# Patient Record
Sex: MALE | Race: WHITE | NOT HISPANIC OR LATINO | Employment: FULL TIME | URBAN - NONMETROPOLITAN AREA
[De-identification: names, ages, dates, MRNs, and addresses within clinical notes are randomized per-mention and may not be internally consistent; named-entity substitution may affect disease eponyms.]

---

## 2020-04-20 ENCOUNTER — OFFICE VISIT CONVERTED (OUTPATIENT)
Dept: FAMILY MEDICINE CLINIC | Age: 40
End: 2020-04-20
Attending: NURSE PRACTITIONER

## 2020-05-29 ENCOUNTER — OFFICE VISIT CONVERTED (OUTPATIENT)
Dept: FAMILY MEDICINE CLINIC | Age: 40
End: 2020-05-29
Attending: NURSE PRACTITIONER

## 2020-07-24 ENCOUNTER — OFFICE VISIT CONVERTED (OUTPATIENT)
Dept: FAMILY MEDICINE CLINIC | Age: 40
End: 2020-07-24
Attending: NURSE PRACTITIONER

## 2020-07-24 ENCOUNTER — HOSPITAL ENCOUNTER (OUTPATIENT)
Dept: OTHER | Facility: HOSPITAL | Age: 40
Discharge: HOME | End: 2020-07-24
Attending: NURSE PRACTITIONER

## 2020-07-25 LAB
ALBUMIN SERPL-MCNC: 4.6 G/DL (ref 3.5–5)
ALBUMIN/GLOB SERPL: 1.7 {RATIO} (ref 1.4–2.6)
ALP SERPL-CCNC: 70 U/L (ref 53–128)
ALT SERPL-CCNC: 13 U/L (ref 10–40)
ANION GAP SERPL CALC-SCNC: 18 MMOL/L (ref 8–19)
AST SERPL-CCNC: 15 U/L (ref 15–50)
BASOPHILS # BLD MANUAL: 0.04 10*3/UL (ref 0–0.2)
BASOPHILS NFR BLD MANUAL: 0.6 % (ref 0–3)
BILIRUB SERPL-MCNC: 0.42 MG/DL (ref 0.2–1.3)
BUN SERPL-MCNC: 13 MG/DL (ref 5–25)
BUN/CREAT SERPL: 12 {RATIO} (ref 6–20)
CALCIUM SERPL-MCNC: 9.4 MG/DL (ref 8.7–10.4)
CHLORIDE SERPL-SCNC: 101 MMOL/L (ref 99–111)
CHOLEST SERPL-MCNC: 159 MG/DL (ref 107–200)
CHOLEST/HDLC SERPL: 4 {RATIO} (ref 3–6)
CONV CO2: 23 MMOL/L (ref 22–32)
CONV TOTAL PROTEIN: 7.3 G/DL (ref 6.3–8.2)
CREAT UR-MCNC: 1.08 MG/DL (ref 0.7–1.2)
DEPRECATED RDW RBC AUTO: 39.8 FL
EOSINOPHIL # BLD MANUAL: 0.06 10*3/UL (ref 0–0.7)
EOSINOPHIL NFR BLD MANUAL: 0.9 % (ref 0–7)
ERYTHROCYTE [DISTWIDTH] IN BLOOD BY AUTOMATED COUNT: 12.1 % (ref 11.5–14.5)
GFR SERPLBLD BASED ON 1.73 SQ M-ARVRAT: >60 ML/MIN/{1.73_M2}
GLOBULIN UR ELPH-MCNC: 2.7 G/DL (ref 2–3.5)
GLUCOSE SERPL-MCNC: 94 MG/DL (ref 70–99)
GRANS (ABSOLUTE): 3.9 10*3/UL (ref 2–8)
GRANS: 61.7 % (ref 30–85)
HBA1C MFR BLD: 15.6 G/DL (ref 14–18)
HCT VFR BLD AUTO: 44.2 % (ref 42–52)
HDLC SERPL-MCNC: 40 MG/DL (ref 40–60)
IMM GRANULOCYTES # BLD: 0 10*3/UL (ref 0–0.54)
IMM GRANULOCYTES NFR BLD: 0 % (ref 0–0.43)
LDLC SERPL CALC-MCNC: 105 MG/DL (ref 70–100)
LYMPHOCYTES # BLD MANUAL: 1.78 10*3/UL (ref 1–5)
LYMPHOCYTES NFR BLD MANUAL: 8.7 % (ref 3–10)
MCH RBC QN AUTO: 31.5 PG (ref 27–31)
MCHC RBC AUTO-ENTMCNC: 35.3 G/DL (ref 33–37)
MCV RBC AUTO: 89.1 FL (ref 80–96)
MONOCYTES # BLD AUTO: 0.55 10*3/UL (ref 0.2–1.2)
OSMOLALITY SERPL CALC.SUM OF ELEC: 284 MOSM/KG (ref 273–304)
PLATELET # BLD AUTO: 275 10*3/UL (ref 130–400)
PMV BLD AUTO: 9.6 FL (ref 7.4–10.4)
POTASSIUM SERPL-SCNC: 4.6 MMOL/L (ref 3.5–5.3)
RBC # BLD AUTO: 4.96 10*6/UL (ref 4.7–6.1)
SODIUM SERPL-SCNC: 137 MMOL/L (ref 135–147)
TRIGL SERPL-MCNC: 71 MG/DL (ref 40–150)
TSH SERPL-ACNC: 1.64 M[IU]/L (ref 0.27–4.2)
VARIANT LYMPHS NFR BLD MANUAL: 28.1 % (ref 20–45)
VLDLC SERPL-MCNC: 14 MG/DL (ref 5–37)
WBC # BLD AUTO: 6.33 10*3/UL (ref 4.8–10.8)

## 2020-10-23 ENCOUNTER — OFFICE VISIT CONVERTED (OUTPATIENT)
Dept: FAMILY MEDICINE CLINIC | Age: 40
End: 2020-10-23
Attending: NURSE PRACTITIONER

## 2021-04-09 ENCOUNTER — OFFICE VISIT CONVERTED (OUTPATIENT)
Dept: FAMILY MEDICINE CLINIC | Age: 41
End: 2021-04-09
Attending: NURSE PRACTITIONER

## 2021-05-18 NOTE — PROGRESS NOTES
Javier Almeida TRAVIS  1980     Office/Outpatient Visit    Visit Date: Fri, Oct 23, 2020 11:31 am    Provider: Stephanie Porras N.P. (Assistant: Gabriella Bennett MA)    Location: [facility]        Electronically signed by Stephanie Porras N.P. on  10/23/2020 12:22:23 PM                             Subjective:        CC: LEONEL is a 40 year old White male.  Patient presents today for follow up visit         HPI: HILLARY ROBLES is following up on anxiety and IBS. IBS was diagnosed about 2 years ago when living in [facility]. He had colonoscopy, EGD, and advised to eat high fiber diet. He takes Dicyclomine on occasion and PPI daily. He had noticed some anxiety earlier this year and that had worsened his IBS symptoms. He had not been sleeping well, been short tempered, and felt like he was having panic attacks. No suicidal ideation. He was started on Citalopram 4/2020 and Trazodone to help with sleep was added 3 months ago. Notes that he has been doing well. Desires to continue same medications. Denies suicidal ideation.    ROS:     CONSTITUTIONAL:  Negative for chills and fever.      CARDIOVASCULAR:  Negative for chest pain and palpitations.      RESPIRATORY:  Negative for dyspnea and frequent wheezing.      NEUROLOGICAL:  Negative for dizziness and headaches.      PSYCHIATRIC:  Positive for anxiety ( (stable on current medications) ) and (improved with current medications).   Negative for suicidal thoughts.          Past Medical History / Family History / Social History:         Last Reviewed on 4/20/2020 12:00 PM by Stephanie Porras    Past Medical History:             PAST MEDICAL HISTORY         Hypertension: no longer on medication;     Asthma: dx'd at age childhood;     Irritable Bowel Syndrome: diagnosed 2018 when living in [facility];     Allergies         PREVENTIVE HEALTH MAINTENANCE             COLORECTAL CANCER SCREENING: Up to date (colonoscopy q10y; sigmoidoscopy q5y; Cologuard q3y) was last done  8/26/2010, Results are in chart; EGD done in [facility]         Surgical History:         wisdom teeth removal;         Family History:     Father: Healthy     Mother: Type 2 Diabetes;  adopted     Sister(s): Bipolar Disorder         Social History:     Occupation: [employer]     Marital Status:      Children: 2 children         Tobacco/Alcohol/Supplements:     Last Reviewed on 7/24/2020 11:15 AM by Alfreda Duenas    Tobacco: Current Smoker: He currently smokes some days, Cigars.          Alcohol: Frequency: Socially         Current Problems:     Nicotine dependence, unspecified, uncomplicated    Generalized anxiety disorder    Essential (primary) hypertension    Mild intermittent asthma, uncomplicated    Mixed irritable bowel syndrome    Encounter for general adult medical examination without abnormal findings    Encounter for screening for depression        Immunizations:     None        Allergies:     Last Reviewed on 10/23/2020 11:33 AM by Gabriella Bennett    Penicillins:          Current Medications:     Last Reviewed on 10/23/2020 11:33 AM by Gabriella Bennett    ZyrTEC 10 mg oral tablet [take 1 tablet (10 mg) by oral route once daily]    esomeprazole magnesium 20 mg oral capsule,delayed release (enteric coated) [take 1 capsule]    melatonin 10 mg oral tablet [one tablet po qhs]    naproxen     loratadine 10 mg oral tablet [take 1 tablet (10 mg) by oral route once daily]    dicyclomine 20 mg oral tablet [take 1 tablet (20 mg) by oral route 3 times per day as needed]    citalopram 20 mg oral tablet [take 1 tablet (20 mg) by oral route once daily]    traZODone 50 mg oral tablet [TAKE 1/2 TO 1 TABLET BY MOUTH AT BEDTIME AS NEEDED]        Objective:        Vitals:         Historical:     7/24/2020  BP:   124/81 mm Hg ( (left arm, , sitting, );) 7/24/2020  P:   82bpm ( (left arm (BP Cuff), , sitting, );) 7/24/2020  Wt:   210.6lbs    Current: 10/23/2020 11:34:23 AM    Ht:  5 ft, 7 in;  Wt: 212.6 lbs;   BMI: 33.3T: 97.4 F (temporal);  BP: 136/81 mm Hg (left arm, sitting);  P: 71 bpm (left arm (BP Cuff), sitting);  sCr: 1.08 mg/dL;  GFR: 92.86        Exams:     PHYSICAL EXAM:     GENERAL: well developed, well nourished;  no apparent distress;     RESPIRATORY: normal respiratory rate and pattern with no distress; normal breath sounds with no rales, rhonchi, wheezes or rubs;     CARDIOVASCULAR: normal rate; rhythm is regular;     NEUROLOGIC: mental status: alert and oriented x 3; GROSSLY INTACT     PSYCHIATRIC: appropriate affect and demeanor; normal speech pattern;         Assessment:         F41.1   Generalized anxiety disorder       K58.2   Mixed irritable bowel syndrome       J45.20   Mild intermittent asthma, uncomplicated           ORDERS:         Meds Prescribed:       [Refilled] citalopram 20 mg oral tablet [take 1 tablet (20 mg) by oral route once daily], #90 (ninety) tablets, Refills: 1 (one)         Lab Orders:       APPTO  Appointment need  (In-House)              Other Orders:         Depression screen positive and follow up plan documented  (In-House)                      Plan:         Generalized anxiety disorderContinue current medications. No refill needed on Trazodone at this time as he has only taken 1/2 tablet most nights. F/U 6 months or sooner as needed.     MIPS PHQ-9 Depression Screening: Completed form scanned and in chart; Total Score 5 Positive Depression Screen: Suicide Risk Assessment completed--denies suicidal/homicidal ideation; Stable on medications. No suicidal ideation.      FOLLOW-UP: Schedule a follow-up visit in 6 months.            Prescriptions:       [Refilled] citalopram 20 mg oral tablet [take 1 tablet (20 mg) by oral route once daily], #90 (ninety) tablets, Refills: 1 (one)           Orders:       APPTO  Appointment need  (In-House)              Depression screen positive and follow up plan documented  (In-House)              Mixed irritable bowel syndromeStable         Mild intermittent asthma, uncomplicatedStable            Patient Recommendations:        For  Generalized anxiety disorder:    Schedule a follow-up visit in 6 months.              Charge Capture:         Primary Diagnosis:     F41.1  Generalized anxiety disorder           Orders:      56963  Office/outpatient visit; established patient, level 3  (In-House)            APPTO  Appointment need  (In-House)              Depression screen positive and follow up plan documented  (In-House)              K58.2  Mixed irritable bowel syndrome     J45.20  Mild intermittent asthma, uncomplicated         ADDENDUMS:      ____________________________________    Addendum: 10/28/2020 09:07 Stephanie Kern        Remove [id number]    Add [id number]    AC

## 2021-05-18 NOTE — PROGRESS NOTES
Javier Almeida  1980     Office/Outpatient Visit    Visit Date: Mon, Apr 20, 2020 11:20 am    Provider: Stephanie Porras N.P. (Assistant: Gabriella Bennett MA)    Location: [facility]        Electronically signed by Stephanie Porras N.P. on  04/20/2020 01:58:21 PM                             Subjective:        CC: Mr. Almeida is a 39 year old male.  New patient establishment, discuss IBS and anxiety         HPI: [email or web address]. consented to this telemedicine visit. Persons present during the telemedicine consultation include MIMA., patient, his wife, and BRANDIE Perez. This visit is being conducted over [employer] pedro video and audio.    He reports history of IBS diagnosed about 2 years ago when living in [facility]. He had colonoscopy and EGD at that time. Was advised to eat high fiber diet. Takes Dicyclomine on occasion. Has been doing fairly well until recently has had increased abdominal symptoms along with anxiety. Notes that he has not been sleeping well, been short tempered, and what he describes as panic attacks. Symptoms occur almost daily. Denies suicidal ideation. Feels that he should start some anxiety medications.    Also with history of HTN but is no longer on medication. Report that he has been monitoring and last readings have been running well.    History of asthma without any exacerbations since childhood.    ROS:     CONSTITUTIONAL:  Negative for chills and fever.      CARDIOVASCULAR:  Negative for chest pain and palpitations.      RESPIRATORY:  Negative for dyspnea and frequent wheezing.      GASTROINTESTINAL:  Positive for diarrhea.   Negative for abdominal pain or vomiting.      INTEGUMENTARY:  Negative for rash.      NEUROLOGICAL:  Negative for dizziness and headaches.      PSYCHIATRIC:  Positive for anxiety.   Negative for suicidal thoughts.          Past Medical History / Family History / Social History:         Last Reviewed on 4/20/2020 12:00 PM by Stephanie Porras    Past Medical  History:             PAST MEDICAL HISTORY         Hypertension: no longer on medication;     Asthma: dx'd at age childhood;     Irritable Bowel Syndrome: diagnosed 2018 when living in [facility];     Allergies         PREVENTIVE HEALTH MAINTENANCE             COLORECTAL CANCER SCREENING: Up to date (colonoscopy q10y; sigmoidoscopy q5y; Cologuard q3y) was last done 2018; EGD done in [facility]         Surgical History:         wisdom teeth removal;         Family History:     Father: Healthy     Mother: Type 2 Diabetes;  adopted     Sister(s): Bipolar Disorder         Social History:     Occupation: [employer]     Marital Status:      Children: 2 children         Tobacco/Alcohol/Supplements:     Last Reviewed on 4/20/2020 11:21 AM by Gabriella Bennett    Tobacco: Current Smoker: He currently smokes some days, Cigars.          Alcohol: Frequency: Socially         Current Problems:     Generalized anxiety disorder    Mixed irritable bowel syndrome        Immunizations:     None        Allergies:     Last Reviewed on 4/20/2020 11:21 AM by Gabriella Bennett    Penicillins:          Current Medications:     Last Reviewed on 4/20/2020 11:23 AM by Gabriella Bennett    ZyrTEC 10 mg oral tablet [take 1 tablet (10 mg) by oral route once daily]    esomeprazole magnesium 20 mg oral capsule,delayed release (enteric coated) [take 1 capsule]    melatonin 10 mg oral tablet [one tablet po qhs]        Objective:        Exams:     PHYSICAL EXAM:     GENERAL: well developed, well nourished;  no apparent distress;     EYES: extraocular movements intact;     RESPIRATORY: normal appearance and symmetric expansion of chest wall; normal respiratory rate and pattern with no distress;     NEUROLOGIC: mental status: alert and oriented x 3;     PSYCHIATRIC: affect/demeanor: anxious, tearful;  normal speech pattern; normal thought and perception;         Assessment:         F41.1   Generalized anxiety disorder       K58.2   Mixed  irritable bowel syndrome       I10   Essential (primary) hypertension       J45.20   Mild intermittent asthma, uncomplicated       F17.200   Nicotine dependence, unspecified, uncomplicated           ORDERS:         Meds Prescribed:       [New Rx] citalopram 10 mg oral tablet [take 1 tablet (10 mg) by oral route once daily], #30 (thirty) tablets, Refills: 1 (one)       [New Rx] dicyclomine 20 mg oral tablet [take 1 tablet (20 mg) by oral route 3 times per day as needed], #90 (ninety) tablets, Refills: 0 (zero)         Lab Orders:       APPTO  Appointment need  (In-House)              Other Orders:       4004F  Pt scrnd tobacco use rcvd tobacco cessation talk  (In-House)                      Plan:         Generalized anxiety disorderWill start Citalopram. Potential side effects discussed. Seek care immediately for worsening symptoms such as suicidal ideation.    MIPS Vaccines Flu and Pneumonia updated in Shot record     FOLLOW-UP: Schedule a follow-up appointment in 6 weeks.            Prescriptions:       [New Rx] citalopram 10 mg oral tablet [take 1 tablet (10 mg) by oral route once daily], #30 (thirty) tablets, Refills: 1 (one)           Orders:       APPTO  Appointment need  (In-House)              Mixed irritable bowel syndromeLow FODMAP diet. Info mailed to patient. May need to see gastroenterologist depending on symptom progression.        RECOMMENDATIONS given include: decrease alcohol in diet, decrease caffieine in diet, and increase fiber in diet.            Prescriptions:       [New Rx] dicyclomine 20 mg oral tablet [take 1 tablet (20 mg) by oral route 3 times per day as needed], #90 (ninety) tablets, Refills: 0 (zero)         Essential (primary) hypertensionStable. Will continue to monitor.         Mild intermittent asthma, uncomplicatedStable        Nicotine dependence, unspecified, uncomplicated        RECOMMENDATIONS given include: Counseled on smoking cessation and advised of the benefits to patient's  health if he were to stop smoking. Counseling for less than 3 minutes.            Orders:       4004F  Pt scrnd tobacco use rcvd tobacco cessation talk  (In-House)                  Patient Recommendations:        For  Generalized anxiety disorder:    Schedule a follow-up visit in 6 weeks.          For  Mixed irritable bowel syndrome:    increase fiber in diet         For  Nicotine dependence, unspecified, uncomplicated:        Stop smoking.              Charge Capture:         Primary Diagnosis:     F41.1  Generalized anxiety disorder           Orders:      97260  Office visit - new pt, level 3  (In-House)            APPTO  Appointment need  (In-House)              K58.2  Mixed irritable bowel syndrome     I10  Essential (primary) hypertension     J45.20  Mild intermittent asthma, uncomplicated     F17.200  Nicotine dependence, unspecified, uncomplicated           Orders:      4004F  Pt scrnd tobacco use rcvd tobacco cessation talk  (In-House)

## 2021-05-18 NOTE — PROGRESS NOTES
Javier Almeida  1980     Office/Outpatient Visit    Visit Date: Fri, Jul 24, 2020 11:11 am    Provider: Stephanie Porras N.P. (Assistant: Alfreda Duenas MA)    Location: [facility]        Electronically signed by Stephanie Porras N.P. on  07/30/2020 08:26:01 AM                             Subjective:        CC: LEONEL is a 40 year old male.  physical         HPI:           LEONEL presents with encounter for general adult medical examination without abnormal findings.  He performs testicular self-exams occasionally.  He is not current with his Td and influenza immunization.      Smoking status: He smokes cigars.            PHQ-9 Depression Screening: Completed form scanned and in chart; Total Score 7 HILLARY ROBLES is following up on anxiety and IBS. IBS was diagnosed about 2 years ago when living in [facility]. He had colonoscopy, EGD, and advised to eat high fiber diet. He takes Dicyclomine on occasion and PPI daily. He had noticed some anxiety earlier this year and that had worsened his IBS symptoms. He had not been sleeping well, been short tempered, and felt like he was having panic attacks. No suicidal ideation. He was started on Citalopram 4/2020 and had dose increased at last visit.this. He feels that this has helped take the edge off and can tell a difference since increasing the dose.  He is still having some difficulty staying asleep and wakes up during the night. He has had some recent occupational stressors as well. Missed work on Sunday.    ROS:     CONSTITUTIONAL:  Negative for chills and fever.      E/N/T:  Negative for ear pain and sore throat.      CARDIOVASCULAR:  Negative for chest pain and palpitations.      RESPIRATORY:  Negative for dyspnea and frequent wheezing.      GASTROINTESTINAL:  Positive for acid reflux symptoms ( stable ) and diarrhea ( stable ).      MUSCULOSKELETAL:  Negative for joint stiffness and myalgias.      INTEGUMENTARY:  Negative for rash.      NEUROLOGICAL:   Negative for dizziness and headaches.      HEMATOLOGIC/LYMPHATIC:  Negative for easy bruising and lymphadenopathy.      PSYCHIATRIC:  Positive for anxiety and sleep disturbance.   Negative for suicidal thoughts.          Past Medical History / Family History / Social History:         Last Reviewed on 4/20/2020 12:00 PM by Stephanie Porras    Past Medical History:             PAST MEDICAL HISTORY         Hypertension: no longer on medication;     Asthma: dx'd at age childhood;     Irritable Bowel Syndrome: diagnosed 2018 when living in [facility];     Allergies         PREVENTIVE HEALTH MAINTENANCE             COLORECTAL CANCER SCREENING: Up to date (colonoscopy q10y; sigmoidoscopy q5y; Cologuard q3y) was last done 8/26/2010, Results are in chart; EGD done in [facility]         Surgical History:         wisdom teeth removal;         Family History:     Father: Healthy     Mother: Type 2 Diabetes;  adopted     Sister(s): Bipolar Disorder         Social History:     Occupation: [employer]     Marital Status:      Children: 2 children         Tobacco/Alcohol/Supplements:     Last Reviewed on 5/29/2020 11:05 AM by Samira Zapata    Tobacco: Current Smoker: He currently smokes some days, Cigars.          Alcohol: Frequency: Socially         Current Problems:     Nicotine dependence, unspecified, uncomplicated    Generalized anxiety disorder    Essential (primary) hypertension    Mild intermittent asthma, uncomplicated    Mixed irritable bowel syndrome        Immunizations:     None        Allergies:     Last Reviewed on 5/29/2020 11:05 AM by Samira Zapata    Penicillins:          Current Medications:     Last Reviewed on 5/29/2020 11:06 AM by Samira Zapata    ZyrTEC 10 mg oral tablet [take 1 tablet (10 mg) by oral route once daily]    esomeprazole magnesium 20 mg oral capsule,delayed release (enteric coated) [take 1 capsule]    melatonin 10 mg oral tablet [one tablet po qhs]    naproxen     loratadine 10  mg oral tablet [take 1 tablet (10 mg) by oral route once daily]    citalopram 10 mg oral tablet [TAKE 1 TABLET(10 MG) BY MOUTH EVERY DAY]    dicyclomine 20 mg oral tablet [take 1 tablet (20 mg) by oral route 3 times per day as needed]    citalopram 20 mg oral tablet [take 1 tablet (20 mg) by oral route once daily]        Objective:        Vitals:         Current: 7/24/2020 11:16:48 AM    Ht:  5 ft, 7 in;  Wt: 210.6 lbs;  BMI: 33.0T: 97.6 F (temporal);  BP: 124/81 mm Hg (left arm, sitting);  P: 82 bpm (left arm (BP Cuff), sitting)        Exams:     PHYSICAL EXAM:     GENERAL: well developed, well nourished;  no apparent distress;     EYES: PERRL, EOMI     E/N/T: EARS: external auditory canal normal;  bilateral TMs are normal;  NOSE: normal turbinates; no sinus tenderness; OROPHARYNX: oral mucosa is normal; posterior pharynx shows no exudate;     NECK: range of motion is normal; trachea is midline;     RESPIRATORY: normal respiratory rate and pattern with no distress; normal breath sounds with no rales, rhonchi, wheezes or rubs;     CARDIOVASCULAR: normal rate; rhythm is regular;     GASTROINTESTINAL: nontender; normal bowel sounds; no organomegaly;     SKIN:  no significant rashes or lesions; no suspicious moles;     MUSCULOSKELETAL: normal gait; normal range of motion of all major muscle groups; no limb or joint pain with range of motion;     NEUROLOGIC: mental status: alert and oriented x 3; GROSSLY INTACT     PSYCHIATRIC: appropriate affect and demeanor; normal speech pattern; normal thought and perception;         Assessment:         Z00.00   Encounter for general adult medical examination without abnormal findings       Z13.31   Encounter for screening for depression       F17.200   Nicotine dependence, unspecified, uncomplicated       F41.1   Generalized anxiety disorder           ORDERS:         Meds Prescribed:       [Refilled] citalopram 20 mg oral tablet [take 1 tablet (20 mg) by oral route once daily], #30  (thirty) tablets, Refills: 2 (two)       [New Rx] traZODone 50 mg oral tablet [take 1/2 to 1 tablet by oral route at bedtime as needed], #30 (thirty) tablets, Refills: 1 (one)         Lab Orders:       [id number]  [facility] PHYSICAL: CMP, CBC, TSH, LIPID: [id number], [id number]  (Send-Out)            APPTO  Appointment need  (In-House)              Other Orders:       4004F  Pt scrnd tobacco use vd tobacco cessation talk  (In-House)              Depression screen positive and follow up plan documented  (In-House)                      Plan:         Encounter for general adult medical examination without abnormal findings    LABORATORY:  Labs ordered to be performed today include PHYSICAL PANEL; CMP, CBC, TSH, LIPID.  [facility] Vaccines Flu and Pneumonia updated in Shot record     COUNSELING was provided today regarding the following topics: healthy eating habits, regular exercise, testicular self-exam, and ADVISED TO SEE AN EYE DOCTOR AND A DENTIST REGULARLY.            Orders:       [id number]  [facility] PHYSICAL: CMP, CBC, TSH, LIPID: [id number], [id number]  (Send-Out)              Encounter for screening for depression    [facility] PHQ-9 Depression Screening: Completed form scanned and in chart; Total Score 7 Positive Depression Screen: Suicide Risk Assessment completed--denies suicidal/homicidal ideation; Pharmacologic intervention initiated/modified           Orders:         Depression screen positive and follow up plan documented  (In-House)              Nicotine dependence, unspecified, uncomplicated        RECOMMENDATIONS given include: Counseled on smoking cessation and advised of the benefits to patient's health if he were to stop smoking. Counseling for less than 3 minutes.            Orders:       4004F  Pt scrnd tobacco use rcvd tobacco cessation talk  (In-House)              Generalized anxiety disorderContinue Citalopram and add Trazodone to help with sleep. F/U 3 months or sooner as needed. Seek  care immediately for worsening symptoms such as suicidal ideation.        FOLLOW-UP: Schedule a follow-up visit in 3 months.            Prescriptions:       [Refilled] citalopram 20 mg oral tablet [take 1 tablet (20 mg) by oral route once daily], #30 (thirty) tablets, Refills: 2 (two)       [New Rx] traZODone 50 mg oral tablet [take 1/2 to 1 tablet by oral route at bedtime as needed], #30 (thirty) tablets, Refills: 1 (one)           Orders:       APPTO  Appointment need  (In-House)                  Patient Recommendations:        For  Encounter for general adult medical examination without abnormal findings:    Limit dietary intake of fat (especially saturated fat) and cholesterol.  Eat a variety of foods, including plenty of fruits, vegetables, and grain containg fiber, limit fat intake to 30% of total calories. Balance caloric intake with energy expended. Maintaining regular physical activity is advised to help prevent heart disease, hypertension, diabetes, and obesity.    Testicular cancer is the #1 cancer in men ages 15-35.  You should regularly examine your testicles for knots, lumps, or tenderness. Testicular pain, even if not associated with any masses, needs to be evaluated by your doctor!          For  Nicotine dependence, unspecified, uncomplicated:        Stop smoking.          For  Generalized anxiety disorder:    Schedule a follow-up visit in 3 months.              Charge Capture:         Primary Diagnosis:     Z00.00  Encounter for general adult medical examination without abnormal findings           Orders:      [id number]  Preventive medicine, established patient, age 18-39 years  (In-House)              Z13.31  Encounter for screening for depression           Orders:        Depression screen positive and follow up plan documented  (In-House)              F17.200  Nicotine dependence, unspecified, uncomplicated           Orders:      4004F  Pt scrnd tobacco use rcvd tobacco cessation talk  (In-House)               F41.1  Generalized anxiety disorder           Orders:      APPTO  Appointment need  (In-House)

## 2021-05-18 NOTE — PROGRESS NOTES
Javier Almeida  1980     Office/Outpatient Visit    Visit Date: Fri, May 29, 2020 11:05 am    Provider: Stephanie Porras N.P. (Assistant: Samira Zapata, RN)    Location: [facility]        Electronically signed by Stephanie Porras N.P. on  05/29/2020 11:38:07 AM                             Subjective:        CC: LEONEL is a 39 year old male.  medication follow up- [facility] video- [phone] Today's encounter is being done with a telehealth visit. He has consented verbally with two witnesses for todays treatment. Todays visit is being conducted by audio and video. Individuals present during the telemedicine consultation include Samira Zapata RN         HPI: HILLARY ROBLES is following up on anxiety and IBS. IBS was diagnosed about 2 years ago when living in [facility]. He had colonoscopy, EGD, and advised to eat high fiber diet. He takes Dicyclomine on occasion and PPI daily. He had noticed some anxiety a few months ago and that had worsened his IBS symptoms. He had not been sleeping well, been short tempered, and felt like he was having panic attacks. No suicidal ideation. He was started on Citalopram one month ago. He has not noticed any side effects from this. He feels like the edge has been taken off and symptoms are not as bad as they were. His stomach as been doing better. He tried to stop his PPI but started having stomach issues that resolved when he restarted. He is still having some difficulty staying asleep and wakes up during the night.     Also with history of HTN but is no longer on medication. Report that he has been monitoring and last readings have been running well.    History of asthma without any exacerbations since childhood.    ROS:     CONSTITUTIONAL:  Negative for chills and fever.      EYES:  Negative for blurred vision and eye drainage.      E/N/T:  Negative for ear pain and sore throat.      CARDIOVASCULAR:  Negative for chest pain and palpitations.      RESPIRATORY:  Negative  for dyspnea and frequent wheezing.      NEUROLOGICAL:  Negative for dizziness and headaches.      PSYCHIATRIC:  Positive for anxiety ( (improved on citalopram) ) and sleep disturbance.   Negative for suicidal thoughts.          Past Medical History / Family History / Social History:         Last Reviewed on 4/20/2020 12:00 PM by Stephanie Porras    Past Medical History:             PAST MEDICAL HISTORY         Hypertension: no longer on medication;     Asthma: dx'd at age childhood;     Irritable Bowel Syndrome: diagnosed 2018 when living in [facility];     Allergies         PREVENTIVE HEALTH MAINTENANCE             COLORECTAL CANCER SCREENING: Up to date (colonoscopy q10y; sigmoidoscopy q5y; Cologuard q3y) was last done 8/26/2010, Results are in chart; EGD done in [facility]         Surgical History:         wisdom teeth removal;         Family History:     Father: Healthy     Mother: Type 2 Diabetes;  adopted     Sister(s): Bipolar Disorder         Social History:     Occupation: [employer]     Marital Status:      Children: 2 children         Tobacco/Alcohol/Supplements:     Last Reviewed on 4/20/2020 11:21 AM by Gabriella Bennett    Tobacco: Current Smoker: He currently smokes some days, Cigars.          Alcohol: Frequency: Socially         Current Problems:     Nicotine dependence, unspecified, uncomplicated    Generalized anxiety disorder    Essential (primary) hypertension    Mild intermittent asthma, uncomplicated    Mixed irritable bowel syndrome        Immunizations:     None        Allergies:     Last Reviewed on 5/29/2020 11:05 AM by Samira Zapata    Penicillins:          Current Medications:     Last Reviewed on 5/29/2020 11:06 AM by Samira Zapata    ZyrTEC 10 mg oral tablet [take 1 tablet (10 mg) by oral route once daily]    esomeprazole magnesium 20 mg oral capsule,delayed release (enteric coated) [take 1 capsule]    melatonin 10 mg oral tablet [one tablet po qhs]    naproxen      citalopram 10 mg oral tablet [TAKE 1 TABLET(10 MG) BY MOUTH EVERY DAY]    dicyclomine 20 mg oral tablet [take 1 tablet (20 mg) by oral route 3 times per day as needed]    loratadine 10 mg oral tablet [take 1 tablet (10 mg) by oral route once daily]        Objective:        Exams:     PHYSICAL EXAM:     GENERAL: well developed, well nourished;  no apparent distress;     RESPIRATORY: normal respiratory rate and pattern with no distress;     NEUROLOGIC: mental status: alert and oriented x 3;     PSYCHIATRIC: appropriate affect and demeanor; normal speech pattern; normal thought and perception;         Assessment:         F41.1   Generalized anxiety disorder       F17.200   Nicotine dependence, unspecified, uncomplicated       I10   Essential (primary) hypertension       J45.20   Mild intermittent asthma, uncomplicated       K58.2   Mixed irritable bowel syndrome           ORDERS:         Meds Prescribed:       [New Rx] citalopram 20 mg oral tablet [take 1 tablet (20 mg) by oral route once daily], #30 (thirty) tablets, Refills: 1 (one)         Other Orders:       4004F  Pt scrnd tobacco use vd tobacco cessation talk  (In-House)                      Plan:         Generalized anxiety disorderWill increase dose of Citalopram to 20 mg daily. F/U 2 months. May need to add something at night to help with sleep if still having problems.           Prescriptions:       [New Rx] citalopram 20 mg oral tablet [take 1 tablet (20 mg) by oral route once daily], #30 (thirty) tablets, Refills: 1 (one)         Nicotine dependence, unspecified, uncomplicated        RECOMMENDATIONS given include: Counseled on smoking cessation and advised of the benefits to patient's health if he were to stop smoking. Counseling for less than 3 minutes.            Orders:       4004F  Pt scrnd tobacco use rcvd tobacco cessation talk  (In-House)              Essential (primary) hypertensionStable. Continue to monitor.         Mild intermittent asthma,  uncomplicatedStable        Mixed irritable bowel syndromeStable. Has started trying to follow low FODMAP diet            Patient Recommendations:        For  Nicotine dependence, unspecified, uncomplicated:        Stop smoking.              Charge Capture:         Primary Diagnosis:     F41.1  Generalized anxiety disorder           Orders:      46720  Office/outpatient visit; established patient, level 4  (In-House)              F17.200  Nicotine dependence, unspecified, uncomplicated           Orders:      4004F  Pt scrnd tobacco use rcvd tobacco cessation talk  (In-House)              I10  Essential (primary) hypertension     J45.20  Mild intermittent asthma, uncomplicated     K58.2  Mixed irritable bowel syndrome

## 2021-07-02 VITALS
HEIGHT: 67 IN | HEART RATE: 71 BPM | BODY MASS INDEX: 33.37 KG/M2 | TEMPERATURE: 97.4 F | WEIGHT: 212.6 LBS | DIASTOLIC BLOOD PRESSURE: 81 MMHG | SYSTOLIC BLOOD PRESSURE: 136 MMHG

## 2021-07-02 VITALS
DIASTOLIC BLOOD PRESSURE: 85 MMHG | HEIGHT: 67 IN | TEMPERATURE: 99.1 F | SYSTOLIC BLOOD PRESSURE: 137 MMHG | WEIGHT: 213.2 LBS | BODY MASS INDEX: 33.46 KG/M2 | HEART RATE: 70 BPM

## 2021-07-02 VITALS
HEIGHT: 67 IN | SYSTOLIC BLOOD PRESSURE: 124 MMHG | BODY MASS INDEX: 33.06 KG/M2 | WEIGHT: 210.6 LBS | TEMPERATURE: 97.6 F | DIASTOLIC BLOOD PRESSURE: 81 MMHG | HEART RATE: 82 BPM

## 2021-07-09 ENCOUNTER — LAB (OUTPATIENT)
Dept: LAB | Facility: HOSPITAL | Age: 41
End: 2021-07-09

## 2021-07-09 ENCOUNTER — OFFICE VISIT (OUTPATIENT)
Dept: FAMILY MEDICINE CLINIC | Age: 41
End: 2021-07-09

## 2021-07-09 VITALS
OXYGEN SATURATION: 97 % | SYSTOLIC BLOOD PRESSURE: 144 MMHG | DIASTOLIC BLOOD PRESSURE: 96 MMHG | HEART RATE: 81 BPM | TEMPERATURE: 98 F | BODY MASS INDEX: 33.9 KG/M2 | WEIGHT: 216 LBS | HEIGHT: 67 IN

## 2021-07-09 DIAGNOSIS — K58.2 MIXED IRRITABLE BOWEL SYNDROME: ICD-10-CM

## 2021-07-09 DIAGNOSIS — Z11.59 SCREENING FOR VIRAL DISEASE: ICD-10-CM

## 2021-07-09 DIAGNOSIS — I10 ESSENTIAL (PRIMARY) HYPERTENSION: ICD-10-CM

## 2021-07-09 DIAGNOSIS — Z13.6 SCREENING FOR CARDIOVASCULAR CONDITION: ICD-10-CM

## 2021-07-09 DIAGNOSIS — J45.20 MILD INTERMITTENT ASTHMA, UNCOMPLICATED: ICD-10-CM

## 2021-07-09 DIAGNOSIS — F41.1 GENERALIZED ANXIETY DISORDER: Primary | ICD-10-CM

## 2021-07-09 PROBLEM — F17.200 NICOTINE DEPENDENCE, UNSPECIFIED, UNCOMPLICATED: Status: ACTIVE | Noted: 2021-07-09

## 2021-07-09 PROBLEM — F17.200 NICOTINE DEPENDENCE, UNSPECIFIED, UNCOMPLICATED: Status: RESOLVED | Noted: 2021-07-09 | Resolved: 2021-07-09

## 2021-07-09 LAB
ALBUMIN SERPL-MCNC: 4.6 G/DL (ref 3.5–5.2)
ALBUMIN/GLOB SERPL: 1.9 G/DL
ALP SERPL-CCNC: 76 U/L (ref 39–117)
ALT SERPL W P-5'-P-CCNC: 25 U/L (ref 1–41)
ANION GAP SERPL CALCULATED.3IONS-SCNC: 7.5 MMOL/L (ref 5–15)
AST SERPL-CCNC: 16 U/L (ref 1–40)
BILIRUB SERPL-MCNC: 0.4 MG/DL (ref 0–1.2)
BUN SERPL-MCNC: 10 MG/DL (ref 6–20)
BUN/CREAT SERPL: 9.8 (ref 7–25)
CALCIUM SPEC-SCNC: 9 MG/DL (ref 8.6–10.5)
CHLORIDE SERPL-SCNC: 104 MMOL/L (ref 98–107)
CHOLEST SERPL-MCNC: 175 MG/DL (ref 0–200)
CO2 SERPL-SCNC: 24.5 MMOL/L (ref 22–29)
CREAT SERPL-MCNC: 1.02 MG/DL (ref 0.76–1.27)
GFR SERPL CREATININE-BSD FRML MDRD: 82 ML/MIN/1.73
GLOBULIN UR ELPH-MCNC: 2.4 GM/DL
GLUCOSE SERPL-MCNC: 81 MG/DL (ref 65–99)
HCV AB SER DONR QL: NORMAL
HDLC SERPL-MCNC: 35 MG/DL (ref 40–60)
LDLC SERPL CALC-MCNC: 114 MG/DL (ref 0–100)
LDLC/HDLC SERPL: 3.18 {RATIO}
POTASSIUM SERPL-SCNC: 4.5 MMOL/L (ref 3.5–5.2)
PROT SERPL-MCNC: 7 G/DL (ref 6–8.5)
SODIUM SERPL-SCNC: 136 MMOL/L (ref 136–145)
TRIGL SERPL-MCNC: 143 MG/DL (ref 0–150)
VLDLC SERPL-MCNC: 26 MG/DL (ref 5–40)

## 2021-07-09 PROCEDURE — 36415 COLL VENOUS BLD VENIPUNCTURE: CPT

## 2021-07-09 PROCEDURE — 86803 HEPATITIS C AB TEST: CPT

## 2021-07-09 PROCEDURE — 80053 COMPREHEN METABOLIC PANEL: CPT

## 2021-07-09 PROCEDURE — 80061 LIPID PANEL: CPT

## 2021-07-09 PROCEDURE — 99214 OFFICE O/P EST MOD 30 MIN: CPT | Performed by: NURSE PRACTITIONER

## 2021-07-09 RX ORDER — CITALOPRAM 40 MG/1
1 TABLET ORAL DAILY
COMMUNITY
Start: 2021-06-17 | End: 2021-07-09 | Stop reason: SDUPTHER

## 2021-07-09 RX ORDER — CETIRIZINE HYDROCHLORIDE 10 MG/1
10 TABLET ORAL DAILY
COMMUNITY
End: 2022-04-29 | Stop reason: ALTCHOICE

## 2021-07-09 RX ORDER — LISINOPRIL 5 MG/1
5 TABLET ORAL DAILY
Qty: 90 TABLET | Refills: 1 | Status: SHIPPED | OUTPATIENT
Start: 2021-07-09 | End: 2021-10-29 | Stop reason: SDUPTHER

## 2021-07-09 RX ORDER — NAPROXEN SODIUM 220 MG
220 TABLET ORAL 2 TIMES DAILY PRN
COMMUNITY

## 2021-07-09 RX ORDER — DICYCLOMINE HCL 20 MG
20 TABLET ORAL EVERY 8 HOURS PRN
COMMUNITY

## 2021-07-09 RX ORDER — TRAZODONE HYDROCHLORIDE 50 MG/1
50 TABLET ORAL NIGHTLY PRN
COMMUNITY

## 2021-07-09 RX ORDER — CITALOPRAM 40 MG/1
40 TABLET ORAL DAILY
Qty: 90 TABLET | Refills: 1 | Status: SHIPPED | OUTPATIENT
Start: 2021-07-09 | End: 2021-10-29 | Stop reason: SDUPTHER

## 2021-07-09 NOTE — PROGRESS NOTES
"Chief Complaint  Hypertension and Irritable Bowel Syndrome (F/U & refills)    Subjective          Javier Almeida presents to [facility]    J. O is following up on anxiety and IBS. IBS was diagnosed when living in [facility]. He had colonoscopy, EGD, and advised to eat high fiber diet. He takes Dicyclomine on occasion and PPI daily. Was started on Citalopram and Trazodone PRN. This helped symptoms as well. His dose of Citalopram was increased to 40 mg daily at last visit.  Has been doing well since his dose was increased. Feeling good.   History of HTN. Was previously on Lisinopril but BP improved and was able to stop medication. Has not taken for about 5 years.           Objective   Vital Signs:   /96   Pulse 81   Temp 98 °F (36.7 °C)   Ht 168.9 cm (66.5\")   Wt 98 kg (216 lb)   SpO2 97%   BMI 34.34 kg/m²     Physical Exam  Vitals reviewed.   Constitutional:       General: He is not in acute distress.     Appearance: Normal appearance. He is well-developed.   HENT:      Head: Normocephalic and atraumatic.   Cardiovascular:      Rate and Rhythm: Normal rate and regular rhythm.   Pulmonary:      Effort: Pulmonary effort is normal.      Breath sounds: Normal breath sounds.   Neurological:      Mental Status: He is alert and oriented to person, place, and time.   Psychiatric:         Mood and Affect: Mood and affect normal.          Result Review :     Physical [facility] Panel (07/24/2020 12:39)           Assessment and Plan    Diagnoses and all orders for this visit:    1. Generalized anxiety disorder (Primary)  Comments:  A medical condition is stable.  Continue same therapy.  Will recheck at next regular appointment.  Orders:  -     citalopram (CeleXA) 40 MG tablet; Take 1 tablet by mouth Daily.  Dispense: 90 tablet; Refill: 1    2. Mixed irritable bowel syndrome  Comments:  Stable.  Has dicyclomine to take as needed.  No refill needed at this " time    3. Mild intermittent asthma, uncomplicated  Comments:  Medical condition is stable.  Continue same therapy.  Will recheck at next regular appointment.    4. Essential (primary) hypertension  Comments:  Will restart lisinopril at 5 mg daily.  SOSA will monitor his blood pressure.  Will adjust as needed  Orders:  -     lisinopril (PRINIVIL,ZESTRIL) 5 MG tablet; Take 1 tablet by mouth Daily for 180 days.  Dispense: 90 tablet; Refill: 1    5. Screening for cardiovascular condition  -     Comprehensive Metabolic Panel; Future  -     Lipid Panel; Future    6. Screening for viral disease  -     Hepatitis C Antibody; Future        Follow Up    Return in about 3 months (around 10/9/2021) for Annual physical.  Patient was given instructions and counseling regarding his condition or for health maintenance advice. Please see specific information pulled into the AVS if appropriate.

## 2021-10-29 ENCOUNTER — OFFICE VISIT (OUTPATIENT)
Dept: FAMILY MEDICINE CLINIC | Age: 41
End: 2021-10-29

## 2021-10-29 VITALS
DIASTOLIC BLOOD PRESSURE: 83 MMHG | OXYGEN SATURATION: 98 % | HEART RATE: 78 BPM | TEMPERATURE: 97.4 F | SYSTOLIC BLOOD PRESSURE: 130 MMHG | HEIGHT: 67 IN | WEIGHT: 217.8 LBS | BODY MASS INDEX: 34.18 KG/M2

## 2021-10-29 DIAGNOSIS — Z00.00 ANNUAL PHYSICAL EXAM: Primary | ICD-10-CM

## 2021-10-29 DIAGNOSIS — I10 ESSENTIAL (PRIMARY) HYPERTENSION: ICD-10-CM

## 2021-10-29 DIAGNOSIS — F41.1 GENERALIZED ANXIETY DISORDER: ICD-10-CM

## 2021-10-29 DIAGNOSIS — K58.2 MIXED IRRITABLE BOWEL SYNDROME: ICD-10-CM

## 2021-10-29 PROCEDURE — 99395 PREV VISIT EST AGE 18-39: CPT | Performed by: NURSE PRACTITIONER

## 2021-10-29 RX ORDER — CITALOPRAM 40 MG/1
40 TABLET ORAL DAILY
Qty: 90 TABLET | Refills: 1 | Status: SHIPPED | OUTPATIENT
Start: 2021-10-29 | End: 2022-05-24 | Stop reason: SDUPTHER

## 2021-10-29 RX ORDER — LISINOPRIL 5 MG/1
5 TABLET ORAL DAILY
Qty: 90 TABLET | Refills: 1 | Status: SHIPPED | OUTPATIENT
Start: 2021-10-29 | End: 2022-05-24 | Stop reason: SDUPTHER

## 2021-10-29 NOTE — PROGRESS NOTES
Chief Complaint  Javier Almeida presents to [facility] for Annual Exam    Subjective          History of Present Illness    LEONEL is here today for annual preventive exam. UTD covid vaccine.   Declines influenza vaccine, Tdap vaccine.  HILLARY ROBLES is following up on anxiety and IBS. IBS was diagnosed when living in [facility]. He had colonoscopy, EGD, and advised to eat high fiber diet. He takes Dicyclomine on occasion and PPI daily. Was started on Citalopram and Trazodone PRN. This helped symptoms as well.   Additionally with history of hypertension. Current medication is Lisinopril 5 mg daily which was started at last appt 3 months ago.     Review of Systems   Constitutional: Negative for chills and fever.   HENT: Negative for ear pain and sore throat.    Eyes: Negative for blurred vision and redness.   Respiratory: Negative for cough, shortness of breath and wheezing.    Cardiovascular: Negative for chest pain and palpitations.   Gastrointestinal: Negative for abdominal pain, constipation, diarrhea, nausea and vomiting.   Genitourinary: Negative for dysuria, frequency and urgency.   Musculoskeletal: Negative for back pain and joint swelling.   Skin: Negative for rash.   Neurological: Negative for dizziness and headache.   Psychiatric/Behavioral: Positive for depressed mood (improved on SSRI). Negative for suicidal ideas.         Allergies   Allergen Reactions   • Penicillins Unknown - Low Severity      Past Medical History:   Diagnosis Date   • Essential (primary) hypertension    • Generalized anxiety disorder    • Mild intermittent asthma, uncomplicated    • Mixed irritable bowel syndrome    • Nicotine dependence, unspecified, uncomplicated      Current Outpatient Medications   Medication Sig Dispense Refill   • cetirizine (zyrTEC) 10 MG tablet Take 10 mg by mouth Daily.     • citalopram (CeleXA) 40 MG tablet Take 1 tablet by mouth Daily. 90 tablet 1   • dicyclomine (BENTYL) 20  "MG tablet Take 20 mg by mouth Every 8 (Eight) Hours As Needed.     • esomeprazole (nexIUM) 20 MG capsule Take 20 mg by mouth Every Morning Before Breakfast.     • lisinopril (PRINIVIL,ZESTRIL) 5 MG tablet Take 1 tablet by mouth Daily. 90 tablet 1   • naproxen sodium (ALEVE) 220 MG tablet Take 220 mg by mouth 2 (Two) Times a Day As Needed.     • traZODone (DESYREL) 50 MG tablet Take 50 mg by mouth At Night As Needed.       No current facility-administered medications for this visit.     Past Surgical History:   Procedure Laterality Date   • COLONOSCOPY  08/26/2010   • WISDOM TOOTH EXTRACTION        Social History     Tobacco Use   • Smoking status: Current Some Day Smoker     Types: Cigars   • Smokeless tobacco: Never Used   Vaping Use   • Vaping Use: Never used   Substance Use Topics   • Alcohol use: Yes     Comment: SOCIALLY   • Drug use: Defer     Family History   Problem Relation Age of Onset   • Diabetes type II Mother         ADOPTED   • No Known Problems Father    • Bipolar disorder Sister      Health Maintenance Due   Topic Date Due   • ANNUAL PHYSICAL  07/25/2021   • INFLUENZA VACCINE  Never done      Immunization History   Administered Date(s) Administered   • COVID-19 (PFIZER) 04/02/2021, 04/23/2021        Objective     Vitals:    10/29/21 1451   BP: 130/83   Pulse: 78   Temp: 97.4 °F (36.3 °C)   SpO2: 98%   Weight: 98.8 kg (217 lb 12.8 oz)   Height: 170.2 cm (67\")     Body mass index is 34.11 kg/m².     Physical Exam  Vitals reviewed.   Constitutional:       General: He is not in acute distress.     Appearance: Normal appearance.   HENT:      Head: Normocephalic and atraumatic.      Right Ear: Hearing, tympanic membrane and ear canal normal.      Left Ear: Hearing, tympanic membrane and ear canal normal.      Mouth/Throat:      Mouth: Mucous membranes are moist.   Eyes:      Extraocular Movements: Extraocular movements intact.      Pupils: Pupils are equal, round, and reactive to light.   Cardiovascular: "      Rate and Rhythm: Normal rate and regular rhythm.   Pulmonary:      Effort: Pulmonary effort is normal. No respiratory distress.      Breath sounds: Normal breath sounds.   Abdominal:      General: Bowel sounds are normal.      Palpations: Abdomen is soft.      Tenderness: There is no abdominal tenderness.   Musculoskeletal:         General: Normal range of motion.      Cervical back: Normal range of motion and neck supple.   Skin:     General: Skin is warm and dry.   Neurological:      Mental Status: He is alert and oriented to person, place, and time.   Psychiatric:         Mood and Affect: Mood normal.           Result Review :     The following data was reviewed by: BRANDIE Blue on 10/29/2021:          Hepatitis C Antibody (07/09/2021 12:47)  Lipid Panel (07/09/2021 12:47)  Comprehensive Metabolic Panel (07/09/2021 12:47)                  Assessment and Plan      Diagnoses and all orders for this visit:    1. Annual physical exam (Primary)  Comments:  Counseled healthy diet, exercise, health maintenance recommendations, regular dental exams.    2. Essential (primary) hypertension  Comments:  Improved on Lisinopril. Will continue current dose.   Orders:  -     lisinopril (PRINIVIL,ZESTRIL) 5 MG tablet; Take 1 tablet by mouth Daily.  Dispense: 90 tablet; Refill: 1    3. Generalized anxiety disorder  Comments:  Stable on celexa 40 mg daily, trazodone PRN. No refills needed trazodone.   Orders:  -     citalopram (CeleXA) 40 MG tablet; Take 1 tablet by mouth Daily.  Dispense: 90 tablet; Refill: 1    4. Mixed irritable bowel syndrome  Comments:  Has diclyclomine to take as needed.               Follow Up     Return in about 6 months (around 4/29/2022) for Recheck.

## 2021-11-18 ENCOUNTER — OFFICE VISIT (OUTPATIENT)
Dept: FAMILY MEDICINE CLINIC | Age: 41
End: 2021-11-18

## 2021-11-18 ENCOUNTER — TELEPHONE (OUTPATIENT)
Dept: FAMILY MEDICINE CLINIC | Age: 41
End: 2021-11-18

## 2021-11-18 VITALS
BODY MASS INDEX: 33.77 KG/M2 | TEMPERATURE: 98.4 F | HEART RATE: 86 BPM | OXYGEN SATURATION: 98 % | WEIGHT: 215.6 LBS | DIASTOLIC BLOOD PRESSURE: 76 MMHG | SYSTOLIC BLOOD PRESSURE: 130 MMHG

## 2021-11-18 DIAGNOSIS — U07.1 COVID-19: Primary | ICD-10-CM

## 2021-11-18 LAB
EXPIRATION DATE: ABNORMAL
FLUAV AG UPPER RESP QL IA.RAPID: NOT DETECTED
FLUBV AG UPPER RESP QL IA.RAPID: NOT DETECTED
INTERNAL CONTROL: ABNORMAL
Lab: ABNORMAL
SARS-COV-2 AG UPPER RESP QL IA.RAPID: DETECTED
SARS-COV-2 N GENE RESP QL NAA+PROBE: DETECTED

## 2021-11-18 PROCEDURE — 99213 OFFICE O/P EST LOW 20 MIN: CPT | Performed by: NURSE PRACTITIONER

## 2021-11-18 PROCEDURE — 87428 SARSCOV & INF VIR A&B AG IA: CPT | Performed by: NURSE PRACTITIONER

## 2021-11-18 PROCEDURE — 87635 SARS-COV-2 COVID-19 AMP PRB: CPT | Performed by: NURSE PRACTITIONER

## 2021-11-18 NOTE — PROGRESS NOTES
Chief Complaint  Javier Almeida presents to [facility] for Cough and Exposure To Known Illness (covid )    Subjective          History of Present Illness    LEONEL is here today with c/o cough, fatigue, nasal stuffiness. This started on 11/15/2021. Known exposure to coworker who tested positive earlier this week for covid. Has had Pfizer covid vaccine X 2.     Review of Systems      Allergies   Allergen Reactions   • Penicillins Unknown - Low Severity      Past Medical History:   Diagnosis Date   • Essential (primary) hypertension    • Generalized anxiety disorder    • Mild intermittent asthma, uncomplicated    • Mixed irritable bowel syndrome    • Nicotine dependence, unspecified, uncomplicated      Current Outpatient Medications   Medication Sig Dispense Refill   • cetirizine (zyrTEC) 10 MG tablet Take 10 mg by mouth Daily.     • citalopram (CeleXA) 40 MG tablet Take 1 tablet by mouth Daily. 90 tablet 1   • dicyclomine (BENTYL) 20 MG tablet Take 20 mg by mouth Every 8 (Eight) Hours As Needed.     • esomeprazole (nexIUM) 20 MG capsule Take 20 mg by mouth Every Morning Before Breakfast.     • lisinopril (PRINIVIL,ZESTRIL) 5 MG tablet Take 1 tablet by mouth Daily. 90 tablet 1   • naproxen sodium (ALEVE) 220 MG tablet Take 220 mg by mouth 2 (Two) Times a Day As Needed.     • traZODone (DESYREL) 50 MG tablet Take 50 mg by mouth At Night As Needed.       No current facility-administered medications for this visit.     Past Surgical History:   Procedure Laterality Date   • COLONOSCOPY  08/26/2010   • WISDOM TOOTH EXTRACTION        Social History     Tobacco Use   • Smoking status: Current Some Day Smoker     Types: Cigars   • Smokeless tobacco: Never Used   Vaping Use   • Vaping Use: Never used   Substance Use Topics   • Alcohol use: Yes     Comment: SOCIALLY   • Drug use: Defer     Family History   Problem Relation Age of Onset   • Diabetes type II Mother         ADOPTED   • No Known  Problems Father    • Bipolar disorder Sister      There are no preventive care reminders to display for this patient.   Immunization History   Administered Date(s) Administered   • COVID-19 (PFIZER) 04/02/2021, 04/23/2021        Objective     Vitals:    11/18/21 0931   BP: 130/76   Pulse: 86   Temp: 98.4 °F (36.9 °C)   SpO2: 98%   Weight: 97.8 kg (215 lb 9.6 oz)     Body mass index is 33.77 kg/m².     Physical Exam  Vitals reviewed.   Constitutional:       General: He is not in acute distress.     Appearance: Normal appearance. He is well-developed.   HENT:      Head: Normocephalic and atraumatic.      Right Ear: Tympanic membrane and ear canal normal.      Left Ear: Tympanic membrane and ear canal normal.      Nose: Nose normal.      Mouth/Throat:      Mouth: Mucous membranes are dry.   Eyes:      Extraocular Movements: Extraocular movements intact.      Pupils: Pupils are equal, round, and reactive to light.   Cardiovascular:      Rate and Rhythm: Normal rate and regular rhythm.   Pulmonary:      Effort: Pulmonary effort is normal.      Breath sounds: Normal breath sounds.   Neurological:      Mental Status: He is alert and oriented to person, place, and time.   Psychiatric:         Mood and Affect: Mood and affect normal.           Result Review :     The following data was reviewed by: BRANDIE Blue on 11/18/2021:          POCT SARS-CoV-2 Antigen ADAMA + Flu (11/18/2021 09:48)                  Assessment and Plan      Diagnoses and all orders for this visit:    1. COVID-19 (Primary)  Comments:  +covid, neg influenza. Rest, fluids. Quarantine discussed. Handout provided. Symptomatic treatment discussed. Declines need for cough med rx.   Orders:  -     POCT SARS-CoV-2 Antigen ADAMA + Flu  -     COVID-19,CEPHEID/SARA/BDMAX,COR/MAGALIE/PAD/DARELL IN-HOUSE(OR EMERGENT/ADD-ON),NP SWAB IN TRANSPORT MEDIA 3-4 HR TAT, RT-PCR - Swab, Nasopharynx    Discussed monoclonal antibody infusion.           Follow Up     Return for  Next scheduled follow up.

## 2021-11-18 NOTE — TELEPHONE ENCOUNTER
Caller: Javier Almeida    Relationship: Self    Best call back number: [phone]     What is the best time to reach you: ANYTIME    Who are you requesting to speak with (clinical staff, provider,  specific staff member): AMADEO, OR OTHER CLINICAL STAFF    What was the call regarding: PATIENT CALLED TO SPEAK WITH CLINICAL STAFF REGARDING TESTING THAT WAS DONE TODAY. HE HAS A FEW QUESTIONS THAT HE WOULD LIKE TO SPEAK TO SOMEONE ABOUT.    Do you require a callback: YES, PLEASE CALL AND ADVISE

## 2022-04-29 ENCOUNTER — OFFICE VISIT (OUTPATIENT)
Dept: FAMILY MEDICINE CLINIC | Age: 42
End: 2022-04-29

## 2022-04-29 VITALS
HEIGHT: 67 IN | DIASTOLIC BLOOD PRESSURE: 73 MMHG | HEART RATE: 79 BPM | SYSTOLIC BLOOD PRESSURE: 122 MMHG | WEIGHT: 219.8 LBS | TEMPERATURE: 99 F | OXYGEN SATURATION: 99 % | BODY MASS INDEX: 34.5 KG/M2

## 2022-04-29 DIAGNOSIS — H69.81 DYSFUNCTION OF RIGHT EUSTACHIAN TUBE: ICD-10-CM

## 2022-04-29 DIAGNOSIS — F41.1 GENERALIZED ANXIETY DISORDER: ICD-10-CM

## 2022-04-29 DIAGNOSIS — I10 ESSENTIAL (PRIMARY) HYPERTENSION: Primary | ICD-10-CM

## 2022-04-29 DIAGNOSIS — Z83.3 FAMILY HISTORY OF DIABETES MELLITUS: ICD-10-CM

## 2022-04-29 PROCEDURE — 99214 OFFICE O/P EST MOD 30 MIN: CPT | Performed by: NURSE PRACTITIONER

## 2022-04-29 RX ORDER — LISINOPRIL 5 MG/1
5 TABLET ORAL DAILY
Qty: 90 TABLET | Refills: 1 | Status: SHIPPED | OUTPATIENT
Start: 2022-04-29 | End: 2022-10-31 | Stop reason: SDUPTHER

## 2022-04-29 RX ORDER — FEXOFENADINE HCL 180 MG/1
180 TABLET ORAL DAILY
COMMUNITY
End: 2022-10-31

## 2022-04-29 RX ORDER — CITALOPRAM 40 MG/1
40 TABLET ORAL DAILY
Qty: 90 TABLET | Refills: 1 | Status: SHIPPED | OUTPATIENT
Start: 2022-04-29 | End: 2022-10-31 | Stop reason: SDUPTHER

## 2022-04-29 RX ORDER — FLUTICASONE PROPIONATE 50 MCG
2 SPRAY, SUSPENSION (ML) NASAL DAILY
Qty: 16 G | Refills: 11 | Status: SHIPPED | OUTPATIENT
Start: 2022-04-29

## 2022-04-29 NOTE — PROGRESS NOTES
Chief Complaint  Javier Almeida presents to [facility] for Hypertension (6 month follow up), Anxiety, and Irritable Bowel Syndrome    Subjective          History of Present Illness    LEONEL is following up on HTN today. Current medication includes Lisinopril 5 mg daily. Tolerating well.   Also following up on anxiety and IBS. Was diagnosed when living in [facility]. He had colonoscopy, EGD, and advised to eat high fiber diet. He takes Dicyclomine on occasion and PPI daily. Was started on Citalopram and Trazodone PRN. This helped symptoms as well.  C/o right ear pressure and difficulty hearing. Noticed one month ago. Taking Allegra for allergies.     Review of Systems      Allergies   Allergen Reactions   • Penicillins Unknown - Low Severity      Past Medical History:   Diagnosis Date   • Essential (primary) hypertension    • Generalized anxiety disorder    • Mild intermittent asthma, uncomplicated    • Mixed irritable bowel syndrome    • Nicotine dependence, unspecified, uncomplicated      Current Outpatient Medications   Medication Sig Dispense Refill   • citalopram (CeleXA) 40 MG tablet Take 1 tablet by mouth Daily. 90 tablet 1   • citalopram (CeleXA) 40 MG tablet Take 1 tablet by mouth Daily. 90 tablet 1   • dicyclomine (BENTYL) 20 MG tablet Take 20 mg by mouth Every 8 (Eight) Hours As Needed.     • esomeprazole (nexIUM) 20 MG capsule Take 20 mg by mouth Every Morning Before Breakfast.     • fexofenadine (ALLEGRA) 180 MG tablet Take 180 mg by mouth Daily.     • lisinopril (PRINIVIL,ZESTRIL) 5 MG tablet Take 1 tablet by mouth Daily. 90 tablet 1   • lisinopril (PRINIVIL,ZESTRIL) 5 MG tablet Take 1 tablet by mouth Daily. 90 tablet 1   • naproxen sodium (ALEVE) 220 MG tablet Take 220 mg by mouth 2 (Two) Times a Day As Needed.     • traZODone (DESYREL) 50 MG tablet Take 50 mg by mouth At Night As Needed.     • fluticasone (Flonase) 50 MCG/ACT nasal spray Instill 2 sprays daily  "into the nostril(s) as directed by provider 16 g 11     No current facility-administered medications for this visit.     Past Surgical History:   Procedure Laterality Date   • COLONOSCOPY  08/26/2010   • WISDOM TOOTH EXTRACTION        Social History     Tobacco Use   • Smoking status: Current Some Day Smoker     Types: Cigars   • Smokeless tobacco: Never Used   Vaping Use   • Vaping Use: Never used   Substance Use Topics   • Alcohol use: Yes     Comment: SOCIALLY   • Drug use: Defer     Family History   Problem Relation Age of Onset   • Diabetes type II Mother         ADOPTED   • No Known Problems Father    • Bipolar disorder Sister      There are no preventive care reminders to display for this patient.   Immunization History   Administered Date(s) Administered   • COVID-19 (PFIZER) PURPLE CAP 04/02/2021, 04/23/2021        Objective     Vitals:    04/29/22 1547   BP: 122/73   BP Location: Left arm   Patient Position: Sitting   Cuff Size: Large Adult   Pulse: 79   Temp: 99 °F (37.2 °C)   TempSrc: Oral   SpO2: 99%   Weight: 99.7 kg (219 lb 12.8 oz)   Height: 170.2 cm (67\")     Body mass index is 34.43 kg/m².     Physical Exam  Vitals reviewed.   Constitutional:       General: He is not in acute distress.     Appearance: Normal appearance. He is well-developed.   HENT:      Head: Normocephalic and atraumatic.      Right Ear: Ear canal normal. A middle ear effusion is present.      Left Ear: Tympanic membrane and ear canal normal.      Mouth/Throat:      Mouth: Mucous membranes are moist.   Eyes:      Extraocular Movements: Extraocular movements intact.      Pupils: Pupils are equal, round, and reactive to light.   Cardiovascular:      Rate and Rhythm: Normal rate and regular rhythm.   Pulmonary:      Effort: Pulmonary effort is normal.      Breath sounds: Normal breath sounds.   Neurological:      Mental Status: He is alert and oriented to person, place, and time.   Psychiatric:         Mood and Affect: Mood and " affect normal.           Result Review :     The following data was reviewed by: BRANDIE Blue on 04/29/2022:          Hepatitis C Antibody (07/09/2021 12:47)  Lipid Panel (07/09/2021 12:47)  Comprehensive Metabolic Panel (07/09/2021 12:47)                  Assessment and Plan      Diagnoses and all orders for this visit:    1. Essential (primary) hypertension (Primary)  Comments:  Will return for fasting labs  Assessment & Plan:    Medical condition is stable.  Continue same therapy.  Will recheck at next regular appointment      Orders:  -     lisinopril (PRINIVIL,ZESTRIL) 5 MG tablet; Take 1 tablet by mouth Daily.  Dispense: 90 tablet; Refill: 1  -     Comprehensive metabolic panel; Future  -     Lipid panel; Future    2. Generalized anxiety disorder  Comments:  Stable on celexa 40 mg daily, trazodone PRN. No refills needed trazodone.   Orders:  -     citalopram (CeleXA) 40 MG tablet; Take 1 tablet by mouth Daily.  Dispense: 90 tablet; Refill: 1    3. Dysfunction of right eustachian tube  Comments:  Continue antihistamine. Add Flonase.   Orders:  -     fluticasone (Flonase) 50 MCG/ACT nasal spray; Instill 2 sprays daily into the nostril(s) as directed by provider  Dispense: 16 g; Refill: 11    4. Family history of diabetes mellitus  -     Hemoglobin A1c; Future            Follow Up     Return in about 6 months (around 10/29/2022) for Annual physical.

## 2022-05-10 ENCOUNTER — TELEPHONE (OUTPATIENT)
Dept: FAMILY MEDICINE CLINIC | Age: 42
End: 2022-05-10

## 2022-05-20 ENCOUNTER — LAB (OUTPATIENT)
Dept: LAB | Facility: HOSPITAL | Age: 42
End: 2022-05-20

## 2022-05-20 DIAGNOSIS — Z83.3 FAMILY HISTORY OF DIABETES MELLITUS: ICD-10-CM

## 2022-05-20 DIAGNOSIS — I10 ESSENTIAL (PRIMARY) HYPERTENSION: ICD-10-CM

## 2022-05-20 LAB
ALBUMIN SERPL-MCNC: 4.4 G/DL (ref 3.5–5.2)
ALBUMIN/GLOB SERPL: 1.5 G/DL
ALP SERPL-CCNC: 68 U/L (ref 39–117)
ALT SERPL W P-5'-P-CCNC: 14 U/L (ref 1–41)
ANION GAP SERPL CALCULATED.3IONS-SCNC: 10.7 MMOL/L (ref 5–15)
AST SERPL-CCNC: 15 U/L (ref 1–40)
BILIRUB SERPL-MCNC: 0.6 MG/DL (ref 0–1.2)
BUN SERPL-MCNC: 14 MG/DL (ref 6–20)
BUN/CREAT SERPL: 12.7 (ref 7–25)
CALCIUM SPEC-SCNC: 9.5 MG/DL (ref 8.6–10.5)
CHLORIDE SERPL-SCNC: 105 MMOL/L (ref 98–107)
CHOLEST SERPL-MCNC: 165 MG/DL (ref 0–200)
CO2 SERPL-SCNC: 22.3 MMOL/L (ref 22–29)
CREAT SERPL-MCNC: 1.1 MG/DL (ref 0.76–1.27)
EGFRCR SERPLBLD CKD-EPI 2021: 87.6 ML/MIN/1.73
GLOBULIN UR ELPH-MCNC: 2.9 GM/DL
GLUCOSE SERPL-MCNC: 92 MG/DL (ref 65–99)
HBA1C MFR BLD: 4.8 % (ref 4.8–5.6)
HDLC SERPL-MCNC: 37 MG/DL (ref 40–60)
LDLC SERPL CALC-MCNC: 110 MG/DL (ref 0–100)
LDLC/HDLC SERPL: 2.95 {RATIO}
POTASSIUM SERPL-SCNC: 4.8 MMOL/L (ref 3.5–5.2)
PROT SERPL-MCNC: 7.3 G/DL (ref 6–8.5)
SODIUM SERPL-SCNC: 138 MMOL/L (ref 136–145)
TRIGL SERPL-MCNC: 95 MG/DL (ref 0–150)
VLDLC SERPL-MCNC: 18 MG/DL (ref 5–40)

## 2022-05-20 PROCEDURE — 80053 COMPREHEN METABOLIC PANEL: CPT

## 2022-05-20 PROCEDURE — 83036 HEMOGLOBIN GLYCOSYLATED A1C: CPT

## 2022-05-20 PROCEDURE — 36415 COLL VENOUS BLD VENIPUNCTURE: CPT

## 2022-05-20 PROCEDURE — 80061 LIPID PANEL: CPT

## 2022-10-31 ENCOUNTER — OFFICE VISIT (OUTPATIENT)
Dept: FAMILY MEDICINE CLINIC | Age: 42
End: 2022-10-31

## 2022-10-31 ENCOUNTER — LAB (OUTPATIENT)
Dept: LAB | Facility: HOSPITAL | Age: 42
End: 2022-10-31

## 2022-10-31 VITALS
TEMPERATURE: 98.5 F | HEART RATE: 86 BPM | HEIGHT: 67 IN | SYSTOLIC BLOOD PRESSURE: 112 MMHG | WEIGHT: 217.6 LBS | BODY MASS INDEX: 34.15 KG/M2 | DIASTOLIC BLOOD PRESSURE: 76 MMHG

## 2022-10-31 DIAGNOSIS — I10 ESSENTIAL (PRIMARY) HYPERTENSION: ICD-10-CM

## 2022-10-31 DIAGNOSIS — Z23 ENCOUNTER FOR IMMUNIZATION: ICD-10-CM

## 2022-10-31 DIAGNOSIS — F41.1 GENERALIZED ANXIETY DISORDER: ICD-10-CM

## 2022-10-31 DIAGNOSIS — Z00.00 ANNUAL PHYSICAL EXAM: Primary | ICD-10-CM

## 2022-10-31 DIAGNOSIS — Z00.00 ANNUAL PHYSICAL EXAM: ICD-10-CM

## 2022-10-31 LAB
ALBUMIN SERPL-MCNC: 4.9 G/DL (ref 3.5–5.2)
ALBUMIN/GLOB SERPL: 1.9 G/DL
ALP SERPL-CCNC: 78 U/L (ref 39–117)
ALT SERPL W P-5'-P-CCNC: 16 U/L (ref 1–41)
ANION GAP SERPL CALCULATED.3IONS-SCNC: 9.1 MMOL/L (ref 5–15)
AST SERPL-CCNC: 16 U/L (ref 1–40)
BASOPHILS # BLD AUTO: 0.04 10*3/MM3 (ref 0–0.2)
BASOPHILS NFR BLD AUTO: 0.6 % (ref 0–1.5)
BILIRUB SERPL-MCNC: 0.4 MG/DL (ref 0–1.2)
BUN SERPL-MCNC: 15 MG/DL (ref 6–20)
BUN/CREAT SERPL: 13.4 (ref 7–25)
CALCIUM SPEC-SCNC: 9.5 MG/DL (ref 8.6–10.5)
CHLORIDE SERPL-SCNC: 103 MMOL/L (ref 98–107)
CHOLEST SERPL-MCNC: 173 MG/DL (ref 0–200)
CO2 SERPL-SCNC: 23.9 MMOL/L (ref 22–29)
CREAT SERPL-MCNC: 1.12 MG/DL (ref 0.76–1.27)
DEPRECATED RDW RBC AUTO: 40.9 FL (ref 37–54)
EGFRCR SERPLBLD CKD-EPI 2021: 85.2 ML/MIN/1.73
EOSINOPHIL # BLD AUTO: 0.13 10*3/MM3 (ref 0–0.4)
EOSINOPHIL NFR BLD AUTO: 2 % (ref 0.3–6.2)
ERYTHROCYTE [DISTWIDTH] IN BLOOD BY AUTOMATED COUNT: 12 % (ref 12.3–15.4)
GLOBULIN UR ELPH-MCNC: 2.6 GM/DL
GLUCOSE SERPL-MCNC: 87 MG/DL (ref 65–99)
HCT VFR BLD AUTO: 44.3 % (ref 37.5–51)
HDLC SERPL-MCNC: 40 MG/DL (ref 40–60)
HGB BLD-MCNC: 14.7 G/DL (ref 13–17.7)
IMM GRANULOCYTES # BLD AUTO: 0.01 10*3/MM3 (ref 0–0.05)
IMM GRANULOCYTES NFR BLD AUTO: 0.2 % (ref 0–0.5)
LDLC SERPL CALC-MCNC: 116 MG/DL (ref 0–100)
LDLC/HDLC SERPL: 2.86 {RATIO}
LYMPHOCYTES # BLD AUTO: 2.39 10*3/MM3 (ref 0.7–3.1)
LYMPHOCYTES NFR BLD AUTO: 36.8 % (ref 19.6–45.3)
MCH RBC QN AUTO: 30.2 PG (ref 26.6–33)
MCHC RBC AUTO-ENTMCNC: 33.2 G/DL (ref 31.5–35.7)
MCV RBC AUTO: 91.2 FL (ref 79–97)
MONOCYTES # BLD AUTO: 0.49 10*3/MM3 (ref 0.1–0.9)
MONOCYTES NFR BLD AUTO: 7.5 % (ref 5–12)
NEUTROPHILS NFR BLD AUTO: 3.44 10*3/MM3 (ref 1.7–7)
NEUTROPHILS NFR BLD AUTO: 52.9 % (ref 42.7–76)
PLATELET # BLD AUTO: 274 10*3/MM3 (ref 140–450)
PMV BLD AUTO: 9.6 FL (ref 6–12)
POTASSIUM SERPL-SCNC: 4.2 MMOL/L (ref 3.5–5.2)
PROT SERPL-MCNC: 7.5 G/DL (ref 6–8.5)
RBC # BLD AUTO: 4.86 10*6/MM3 (ref 4.14–5.8)
SODIUM SERPL-SCNC: 136 MMOL/L (ref 136–145)
TRIGL SERPL-MCNC: 93 MG/DL (ref 0–150)
VLDLC SERPL-MCNC: 17 MG/DL (ref 5–40)
WBC NRBC COR # BLD: 6.5 10*3/MM3 (ref 3.4–10.8)

## 2022-10-31 PROCEDURE — 80053 COMPREHEN METABOLIC PANEL: CPT

## 2022-10-31 PROCEDURE — 90471 IMMUNIZATION ADMIN: CPT | Performed by: NURSE PRACTITIONER

## 2022-10-31 PROCEDURE — 90715 TDAP VACCINE 7 YRS/> IM: CPT | Performed by: NURSE PRACTITIONER

## 2022-10-31 PROCEDURE — 85025 COMPLETE CBC W/AUTO DIFF WBC: CPT

## 2022-10-31 PROCEDURE — 80061 LIPID PANEL: CPT

## 2022-10-31 PROCEDURE — 36415 COLL VENOUS BLD VENIPUNCTURE: CPT

## 2022-10-31 PROCEDURE — 99396 PREV VISIT EST AGE 40-64: CPT | Performed by: NURSE PRACTITIONER

## 2022-10-31 RX ORDER — CITALOPRAM 40 MG/1
40 TABLET ORAL DAILY
Qty: 90 TABLET | Refills: 1 | Status: SHIPPED | OUTPATIENT
Start: 2022-10-31

## 2022-10-31 RX ORDER — LISINOPRIL 5 MG/1
5 TABLET ORAL DAILY
Qty: 90 TABLET | Refills: 1 | Status: SHIPPED | OUTPATIENT
Start: 2022-10-31 | End: 2023-05-03

## 2022-10-31 NOTE — PROGRESS NOTES
Chief Complaint  Javier Almeida presents to [facility] for Annual Exam    Subjective          History of Present Illness    LEONEL is here today for annual preventive exam.  Has received covid vaccine X 2.  Declines covid booster, influenza, PNA vaccines.   Smokes cigars some days. He reports has been a long time since smoking.     He is also following up on HTN. Current medication includes Lisinopril 5 mg daily. Tolerating well.   Also following up on anxiety and IBS. Was diagnosed when living in [facility]. He had colonoscopy, EGD, and advised to eat high fiber diet. He takes Dicyclomine on occasion and PPI daily. Was started on Citalopram and Trazodone PRN. This helped symptoms as well.    Review of Systems   Constitutional: Negative for chills and fever.   HENT: Negative for ear pain and sore throat.    Eyes: Negative for blurred vision and redness.   Respiratory: Negative for cough, shortness of breath and wheezing.    Cardiovascular: Negative for chest pain and palpitations.   Gastrointestinal: Negative for abdominal pain, constipation, diarrhea, nausea and vomiting.   Genitourinary: Negative for dysuria, frequency and urgency.   Musculoskeletal: Negative for back pain and joint swelling.   Skin: Negative for rash.   Neurological: Negative for dizziness and headache.   Psychiatric/Behavioral: Negative for suicidal ideas and depressed mood.         Allergies   Allergen Reactions   • Penicillins Unknown - Low Severity      Past Medical History:   Diagnosis Date   • Essential (primary) hypertension    • Generalized anxiety disorder    • Mild intermittent asthma, uncomplicated    • Mixed irritable bowel syndrome    • Nicotine dependence, unspecified, uncomplicated      Current Outpatient Medications   Medication Sig Dispense Refill   • citalopram (CeleXA) 40 MG tablet Take 1 tablet by mouth Daily. 90 tablet 1   • dicyclomine (BENTYL) 20 MG tablet Take 20 mg by mouth Every 8  "(Eight) Hours As Needed.     • esomeprazole (nexIUM) 20 MG capsule Take 1 capsule by mouth As Needed.     • fluticasone (Flonase) 50 MCG/ACT nasal spray Instill 2 sprays daily into the nostril(s) as directed by provider 16 g 11   • lisinopril (PRINIVIL,ZESTRIL) 5 MG tablet Take 1 tablet by mouth Daily. 90 tablet 1   • Loratadine (CLARITIN PO) Take  by mouth Daily.     • naproxen sodium (ALEVE) 220 MG tablet Take 220 mg by mouth 2 (Two) Times a Day As Needed.     • traZODone (DESYREL) 50 MG tablet Take 50 mg by mouth At Night As Needed.       No current facility-administered medications for this visit.     Past Surgical History:   Procedure Laterality Date   • COLONOSCOPY  08/26/2010   • WISDOM TOOTH EXTRACTION        Social History     Tobacco Use   • Smoking status: Some Days     Types: Cigars   • Smokeless tobacco: Never   Vaping Use   • Vaping Use: Never used   Substance Use Topics   • Alcohol use: Yes     Comment: SOCIALLY   • Drug use: Defer     Family History   Problem Relation Age of Onset   • Diabetes type II Mother         ADOPTED   • No Known Problems Father    • Bipolar disorder Sister      Health Maintenance Due   Topic Date Due   • ANNUAL PHYSICAL  10/30/2022      Immunization History   Administered Date(s) Administered   • COVID-19 (PFIZER) PURPLE CAP 04/02/2021, 04/23/2021        Objective     Vitals:    10/31/22 1101   BP: 112/76   BP Location: Left arm   Patient Position: Sitting   Cuff Size: Large Adult   Pulse: 86   Temp: 98.5 °F (36.9 °C)   TempSrc: Oral   Weight: 98.7 kg (217 lb 9.6 oz)   Height: 170.2 cm (67.01\")     Body mass index is 34.07 kg/m².     Physical Exam  Vitals reviewed.   Constitutional:       General: He is not in acute distress.     Appearance: Normal appearance.   HENT:      Head: Normocephalic and atraumatic.      Right Ear: Hearing, tympanic membrane and ear canal normal.      Left Ear: Hearing, tympanic membrane and ear canal normal.      Mouth/Throat:      Mouth: Mucous " membranes are moist.   Eyes:      Extraocular Movements: Extraocular movements intact.      Pupils: Pupils are equal, round, and reactive to light.   Cardiovascular:      Rate and Rhythm: Normal rate and regular rhythm.   Pulmonary:      Effort: Pulmonary effort is normal. No respiratory distress.      Breath sounds: Normal breath sounds.   Abdominal:      General: Bowel sounds are normal.      Palpations: Abdomen is soft.      Tenderness: There is no abdominal tenderness.   Musculoskeletal:         General: Normal range of motion.      Cervical back: Normal range of motion and neck supple.   Skin:     General: Skin is warm and dry.   Neurological:      Mental Status: He is alert and oriented to person, place, and time.   Psychiatric:         Mood and Affect: Mood normal.           Result Review :     The following data was reviewed by: BRANDIE Blue on 10/31/2022:          Lipid panel (05/20/2022 09:14)  Comprehensive metabolic panel (05/20/2022 09:14)  Hemoglobin A1c (05/20/2022 09:14)                  Assessment and Plan      Diagnoses and all orders for this visit:    1. Annual physical exam (Primary)  Comments:  Appropriate screenings and vaccinations were reviewed with the pt and offered as indicated.  Pt counseled on healthy lifestyle including healthy diet, exercise.  Orders:  -     CBC & Differential; Future  -     Comprehensive Metabolic Panel; Future  -     Lipid Panel; Future    2. Encounter for immunization  -     Tdap Vaccine Greater Than or Equal To 6yo IM    3. Essential (primary) hypertension  Comments:  Medical condition is stable.  Continue same therapy.  Will recheck at next regular appointment  Orders:  -     lisinopril (PRINIVIL,ZESTRIL) 5 MG tablet; Take 1 tablet by mouth Daily.  Dispense: 90 tablet; Refill: 1    4. Generalized anxiety disorder  Comments:  Stable on celexa 40 mg daily, trazodone PRN. No refills needed on trazodone.   Orders:  -     citalopram (CeleXA) 40 MG tablet; Take  1 tablet by mouth Daily.  Dispense: 90 tablet; Refill: 1              Follow Up     Return in about 6 months (around 4/30/2023) for Recheck.

## 2023-05-08 ENCOUNTER — OFFICE VISIT (OUTPATIENT)
Dept: FAMILY MEDICINE CLINIC | Age: 43
End: 2023-05-08
Payer: COMMERCIAL

## 2023-05-08 VITALS
TEMPERATURE: 98.4 F | WEIGHT: 227.8 LBS | HEART RATE: 79 BPM | HEIGHT: 67 IN | SYSTOLIC BLOOD PRESSURE: 124 MMHG | DIASTOLIC BLOOD PRESSURE: 82 MMHG | BODY MASS INDEX: 35.75 KG/M2

## 2023-05-08 DIAGNOSIS — I10 ESSENTIAL (PRIMARY) HYPERTENSION: ICD-10-CM

## 2023-05-08 DIAGNOSIS — F41.1 GENERALIZED ANXIETY DISORDER: ICD-10-CM

## 2023-05-08 PROCEDURE — 99214 OFFICE O/P EST MOD 30 MIN: CPT | Performed by: NURSE PRACTITIONER

## 2023-05-08 RX ORDER — CITALOPRAM 40 MG/1
40 TABLET ORAL DAILY
Qty: 90 TABLET | Refills: 0 | Status: SHIPPED | OUTPATIENT
Start: 2023-05-08 | End: 2023-05-08

## 2023-05-08 RX ORDER — BUSPIRONE HYDROCHLORIDE 7.5 MG/1
7.5 TABLET ORAL EVERY 8 HOURS PRN
Qty: 90 TABLET | Refills: 1 | Status: SHIPPED | OUTPATIENT
Start: 2023-05-08

## 2023-05-08 RX ORDER — LISINOPRIL 5 MG/1
5 TABLET ORAL DAILY
Qty: 90 TABLET | Refills: 0 | Status: SHIPPED | OUTPATIENT
Start: 2023-05-08

## 2023-05-08 NOTE — PROGRESS NOTES
Chief Complaint  Javier Almeida presents to [facility] for Hypertension    Subjective          History of Present Illness    REYES.MARILEE is following up on HTN. Current medication includes Lisinopril 5 mg daily. Tolerating well.   Also following up on anxiety and IBS. Was diagnosed when living in [facility]. He had colonoscopy, EGD, and advised to eat high fiber diet. He takes Dicyclomine on occasion and PPI daily. Was started on Citalopram and Trazodone PRN. This has been helping symptoms as well. He does note recent increased anxiety. He tried using CBD vape for the past month. This has helped take the edge off of his anxiety. He is wondering what else he could try for increased anxiety.     Review of Systems       Allergies   Allergen Reactions   • Penicillins Unknown - Low Severity      Past Medical History:   Diagnosis Date   • Essential (primary) hypertension    • Generalized anxiety disorder    • Mild intermittent asthma, uncomplicated    • Mixed irritable bowel syndrome    • Nicotine dependence, unspecified, uncomplicated      Current Outpatient Medications   Medication Sig Dispense Refill   • citalopram (CeleXA) 40 MG tablet Take 1 tablet by mouth Daily. 90 tablet 1   • dicyclomine (BENTYL) 20 MG tablet Take 1 tablet by mouth Every 8 (Eight) Hours As Needed.     • esomeprazole (nexIUM) 20 MG capsule Take 1 capsule by mouth As Needed.     • fluticasone (FLONASE) 50 MCG/ACT nasal spray Instill 2 sprays daily into the nostril(s) as directed by provider 16 g 11   • lisinopril (PRINIVIL,ZESTRIL) 5 MG tablet Take 1 tablet by mouth Daily. 90 tablet 1   • lisinopril (PRINIVIL,ZESTRIL) 5 MG tablet Take 1 tablet by mouth Daily. 90 tablet 0   • Loratadine (CLARITIN PO) Take  by mouth Daily.     • naproxen sodium (ALEVE) 220 MG tablet Take 1 tablet by mouth 2 (Two) Times a Day As Needed.     • traZODone (DESYREL) 50 MG tablet Take 1 tablet by mouth At Night As Needed.     • busPIRone  "(BUSPAR) 7.5 MG tablet Take 1 tablet by mouth Every 8 (Eight) Hours As Needed for anxiety. 90 tablet 1     No current facility-administered medications for this visit.     Past Surgical History:   Procedure Laterality Date   • COLONOSCOPY  08/26/2010   • WISDOM TOOTH EXTRACTION        Social History     Tobacco Use   • Smoking status: Former     Types: Cigarettes     Passive exposure: Past   • Smokeless tobacco: Never   Vaping Use   • Vaping Use: Every day   • Substances: CBD, Flavoring   • Devices: Refillable tank   Substance Use Topics   • Alcohol use: Yes     Comment: SOCIALLY   • Drug use: Defer     Family History   Problem Relation Age of Onset   • Diabetes type II Mother         ADOPTED   • No Known Problems Father    • Bipolar disorder Sister      There are no preventive care reminders to display for this patient.   Immunization History   Administered Date(s) Administered   • COVID-19 (PFIZER) Purple Cap Monovalent 04/02/2021, 04/23/2021   • Tdap 10/31/2022        Objective     Vitals:    05/08/23 1133   BP: 124/82   BP Location: Right arm   Patient Position: Sitting   Cuff Size: Large Adult   Pulse: 79   Temp: 98.4 °F (36.9 °C)   TempSrc: Oral   Weight: 103 kg (227 lb 12.8 oz)   Height: 170.2 cm (67.01\")     Body mass index is 35.67 kg/m².     Physical Exam  Vitals reviewed.   Constitutional:       General: He is not in acute distress.     Appearance: Normal appearance. He is well-developed.   HENT:      Head: Normocephalic and atraumatic.   Cardiovascular:      Rate and Rhythm: Normal rate and regular rhythm.   Pulmonary:      Effort: Pulmonary effort is normal.      Breath sounds: Normal breath sounds.   Neurological:      Mental Status: He is alert and oriented to person, place, and time.   Psychiatric:         Mood and Affect: Mood and affect normal.           Result Review :                               Assessment and Plan      Diagnoses and all orders for this visit:    1. Generalized anxiety " disorder  Comments:  Continue Citalopram. Add buspirone PRN. Let me know of concerns.  Orders:  -     Discontinue: citalopram (CeleXA) 40 MG tablet; Take 1 tablet by mouth Daily.  Dispense: 90 tablet; Refill: 0  -     busPIRone (BUSPAR) 7.5 MG tablet; Take 1 tablet by mouth Every 8 (Eight) Hours As Needed for anxiety.  Dispense: 90 tablet; Refill: 1    2. Essential (primary) hypertension  Comments:  Medical condition is stable.  Continue same therapy.  Will recheck at next regular appointment  Orders:  -     lisinopril (PRINIVIL,ZESTRIL) 5 MG tablet; Take 1 tablet by mouth Daily.  Dispense: 90 tablet; Refill: 0              Follow Up     Return in about 3 months (around 8/8/2023) for Recheck.

## 2023-05-14 DIAGNOSIS — H69.81 DYSFUNCTION OF RIGHT EUSTACHIAN TUBE: ICD-10-CM

## 2023-05-15 RX ORDER — FLUTICASONE PROPIONATE 50 MCG
2 SPRAY, SUSPENSION (ML) NASAL DAILY
Qty: 16 G | Refills: 11 | Status: SHIPPED | OUTPATIENT
Start: 2023-05-15

## 2023-06-02 DIAGNOSIS — F41.1 GENERALIZED ANXIETY DISORDER: ICD-10-CM

## 2023-06-02 RX ORDER — CITALOPRAM 40 MG/1
40 TABLET ORAL DAILY
Qty: 90 TABLET | Refills: 1 | Status: SHIPPED | OUTPATIENT
Start: 2023-06-02

## 2023-08-17 ENCOUNTER — OFFICE VISIT (OUTPATIENT)
Dept: FAMILY MEDICINE CLINIC | Age: 43
End: 2023-08-17
Payer: COMMERCIAL

## 2023-08-17 VITALS
WEIGHT: 229 LBS | HEIGHT: 67 IN | BODY MASS INDEX: 35.94 KG/M2 | TEMPERATURE: 98.4 F | HEART RATE: 79 BPM | OXYGEN SATURATION: 97 % | DIASTOLIC BLOOD PRESSURE: 77 MMHG | SYSTOLIC BLOOD PRESSURE: 111 MMHG

## 2023-08-17 DIAGNOSIS — F41.1 GENERALIZED ANXIETY DISORDER: ICD-10-CM

## 2023-08-17 DIAGNOSIS — I10 ESSENTIAL (PRIMARY) HYPERTENSION: ICD-10-CM

## 2023-08-17 PROCEDURE — 99214 OFFICE O/P EST MOD 30 MIN: CPT | Performed by: NURSE PRACTITIONER

## 2023-08-17 RX ORDER — LISINOPRIL 5 MG/1
5 TABLET ORAL DAILY
Qty: 90 TABLET | Refills: 0 | Status: SHIPPED | OUTPATIENT
Start: 2023-08-17

## 2023-08-17 NOTE — PROGRESS NOTES
Chief Complaint  Javier Almeida presents to [facility] for Anxiety (Hypertension )    Subjective          History of Present Illness    REYES.MARILEE is following up on HTN. Current medication includes Lisinopril 5 mg daily. Tolerating well.   Also following up on anxiety. Current medication includes citalopram 40 mg daily and buspirone PRN that was added at last visit. He reports that he has been doing well on current medications. Was taking buspirone a couple of times a day initially but more recently has been taking once per day. This has been helping.       Review of Systems      Allergies   Allergen Reactions    Penicillins Unknown - Low Severity      Past Medical History:   Diagnosis Date    Essential (primary) hypertension     Generalized anxiety disorder     Mild intermittent asthma, uncomplicated     Mixed irritable bowel syndrome     Nicotine dependence, unspecified, uncomplicated      Current Outpatient Medications   Medication Sig Dispense Refill    busPIRone (BUSPAR) 7.5 MG tablet Take 1 tablet by mouth Every 8 (Eight) Hours As Needed for anxiety. 90 tablet 1    citalopram (CeleXA) 40 MG tablet Take 1 tablet by mouth Daily. 90 tablet 1    dicyclomine (BENTYL) 20 MG tablet Take 1 tablet by mouth Every 8 (Eight) Hours As Needed.      esomeprazole (nexIUM) 20 MG capsule Take 1 capsule by mouth As Needed.      fluticasone (FLONASE) 50 MCG/ACT nasal spray Instill 2 sprays into the nostril(s) daily as directed by provider 16 g 11    lisinopril (PRINIVIL,ZESTRIL) 5 MG tablet Take 1 tablet by mouth Daily. 90 tablet 0    Loratadine (CLARITIN PO) Take  by mouth Daily.      naproxen sodium (ALEVE) 220 MG tablet Take 1 tablet by mouth 2 (Two) Times a Day As Needed.      traZODone (DESYREL) 50 MG tablet Take 1 tablet by mouth At Night As Needed.       No current facility-administered medications for this visit.     Past Surgical History:   Procedure Laterality Date    COLONOSCOPY  08/26/2010  "   WISDOM TOOTH EXTRACTION        Social History     Tobacco Use    Smoking status: Former     Packs/day: 0.50     Years: 20.00     Pack years: 10.00     Types: Cigarettes     Quit date: 2013     Years since quitting: 10.6     Passive exposure: Past    Smokeless tobacco: Never   Vaping Use    Vaping Use: Every day    Substances: CBD, Flavoring    Devices: Refillable tank   Substance Use Topics    Alcohol use: Yes     Comment: SOCIALLY    Drug use: Defer     Family History   Problem Relation Age of Onset    Diabetes type II Mother         ADOPTED    No Known Problems Father     Bipolar disorder Sister      There are no preventive care reminders to display for this patient.     Immunization History   Administered Date(s) Administered    COVID-19 (PFIZER) Purple Cap Monovalent 04/02/2021, 04/23/2021    Tdap 10/31/2022        Objective     Vitals:    08/17/23 1140   BP: 111/77   BP Location: Left arm   Patient Position: Sitting   Cuff Size: Large Adult   Pulse: 79   Temp: 98.4 øF (36.9 øC)   TempSrc: Oral   SpO2: 97%   Weight: 104 kg (229 lb)   Height: 170.2 cm (67.01\")     Body mass index is 35.86 kg/mý.         Physical Exam  Vitals reviewed.   Constitutional:       General: He is not in acute distress.     Appearance: Normal appearance. He is well-developed.   HENT:      Head: Normocephalic and atraumatic.   Cardiovascular:      Rate and Rhythm: Normal rate.   Pulmonary:      Effort: Pulmonary effort is normal.   Neurological:      Mental Status: He is alert and oriented to person, place, and time.   Psychiatric:         Mood and Affect: Mood and affect normal.         Result Review :                               Assessment and Plan      Diagnoses and all orders for this visit:    1. Essential (primary) hypertension  Comments:  Medical condition is stable.  Continue same therapy.  Will recheck at next regular appointment  Orders:  -     lisinopril (PRINIVIL,ZESTRIL) 5 MG tablet; Take 1 tablet by mouth Daily.  " Dispense: 90 tablet; Refill: 0    2. Generalized anxiety disorder  Comments:  Improvement. Medical condition is stable.  Continue same therapy.  Will recheck at next regular appointment. No refills needed at this time.              Follow Up     Return for Next scheduled follow up.

## 2023-11-05 DIAGNOSIS — F41.1 GENERALIZED ANXIETY DISORDER: ICD-10-CM

## 2023-11-06 RX ORDER — BUSPIRONE HYDROCHLORIDE 7.5 MG/1
7.5 TABLET ORAL EVERY 8 HOURS PRN
Qty: 90 TABLET | Refills: 0 | Status: SHIPPED | OUTPATIENT
Start: 2023-11-06 | End: 2023-11-13 | Stop reason: SDUPTHER

## 2023-11-13 ENCOUNTER — OFFICE VISIT (OUTPATIENT)
Dept: FAMILY MEDICINE CLINIC | Age: 43
End: 2023-11-13
Payer: COMMERCIAL

## 2023-11-13 ENCOUNTER — LAB (OUTPATIENT)
Dept: LAB | Facility: HOSPITAL | Age: 43
End: 2023-11-13
Payer: COMMERCIAL

## 2023-11-13 VITALS
WEIGHT: 234 LBS | HEIGHT: 67 IN | SYSTOLIC BLOOD PRESSURE: 122 MMHG | BODY MASS INDEX: 36.73 KG/M2 | DIASTOLIC BLOOD PRESSURE: 82 MMHG | OXYGEN SATURATION: 95 % | HEART RATE: 77 BPM | TEMPERATURE: 98.3 F

## 2023-11-13 DIAGNOSIS — Z00.00 ANNUAL PHYSICAL EXAM: ICD-10-CM

## 2023-11-13 DIAGNOSIS — Z00.00 ANNUAL PHYSICAL EXAM: Primary | ICD-10-CM

## 2023-11-13 DIAGNOSIS — F41.1 GENERALIZED ANXIETY DISORDER: ICD-10-CM

## 2023-11-13 DIAGNOSIS — I10 ESSENTIAL (PRIMARY) HYPERTENSION: ICD-10-CM

## 2023-11-13 LAB
ALBUMIN SERPL-MCNC: 4.8 G/DL (ref 3.5–5.2)
ALBUMIN/GLOB SERPL: 2.1 G/DL
ALP SERPL-CCNC: 76 U/L (ref 39–117)
ALT SERPL W P-5'-P-CCNC: 31 U/L (ref 1–41)
ANION GAP SERPL CALCULATED.3IONS-SCNC: 9 MMOL/L (ref 5–15)
AST SERPL-CCNC: 20 U/L (ref 1–40)
BILIRUB SERPL-MCNC: 0.5 MG/DL (ref 0–1.2)
BUN SERPL-MCNC: 15 MG/DL (ref 6–20)
BUN/CREAT SERPL: 13.3 (ref 7–25)
CALCIUM SPEC-SCNC: 9.7 MG/DL (ref 8.6–10.5)
CHLORIDE SERPL-SCNC: 103 MMOL/L (ref 98–107)
CHOLEST SERPL-MCNC: 184 MG/DL (ref 0–200)
CO2 SERPL-SCNC: 26 MMOL/L (ref 22–29)
CREAT SERPL-MCNC: 1.13 MG/DL (ref 0.76–1.27)
DEPRECATED RDW RBC AUTO: 40.7 FL (ref 37–54)
EGFRCR SERPLBLD CKD-EPI 2021: 83.7 ML/MIN/1.73
ERYTHROCYTE [DISTWIDTH] IN BLOOD BY AUTOMATED COUNT: 12.3 % (ref 12.3–15.4)
GLOBULIN UR ELPH-MCNC: 2.3 GM/DL
GLUCOSE SERPL-MCNC: 88 MG/DL (ref 65–99)
HCT VFR BLD AUTO: 44.2 % (ref 37.5–51)
HDLC SERPL-MCNC: 39 MG/DL (ref 40–60)
HGB BLD-MCNC: 15.1 G/DL (ref 13–17.7)
LDLC SERPL CALC-MCNC: 124 MG/DL (ref 0–100)
LDLC/HDLC SERPL: 3.11 {RATIO}
MCH RBC QN AUTO: 30.8 PG (ref 26.6–33)
MCHC RBC AUTO-ENTMCNC: 34.2 G/DL (ref 31.5–35.7)
MCV RBC AUTO: 90 FL (ref 79–97)
PLATELET # BLD AUTO: 301 10*3/MM3 (ref 140–450)
PMV BLD AUTO: 9.8 FL (ref 6–12)
POTASSIUM SERPL-SCNC: 4.3 MMOL/L (ref 3.5–5.2)
PROT SERPL-MCNC: 7.1 G/DL (ref 6–8.5)
RBC # BLD AUTO: 4.91 10*6/MM3 (ref 4.14–5.8)
SODIUM SERPL-SCNC: 138 MMOL/L (ref 136–145)
TRIGL SERPL-MCNC: 118 MG/DL (ref 0–150)
VLDLC SERPL-MCNC: 21 MG/DL (ref 5–40)
WBC NRBC COR # BLD: 6.96 10*3/MM3 (ref 3.4–10.8)

## 2023-11-13 PROCEDURE — 99396 PREV VISIT EST AGE 40-64: CPT | Performed by: NURSE PRACTITIONER

## 2023-11-13 PROCEDURE — 80061 LIPID PANEL: CPT

## 2023-11-13 PROCEDURE — 85027 COMPLETE CBC AUTOMATED: CPT

## 2023-11-13 PROCEDURE — 80053 COMPREHEN METABOLIC PANEL: CPT

## 2023-11-13 PROCEDURE — 36415 COLL VENOUS BLD VENIPUNCTURE: CPT

## 2023-11-13 RX ORDER — BUSPIRONE HYDROCHLORIDE 7.5 MG/1
7.5 TABLET ORAL EVERY 8 HOURS PRN
Qty: 90 TABLET | Refills: 1 | Status: SHIPPED | OUTPATIENT
Start: 2023-11-13

## 2023-11-13 RX ORDER — LISINOPRIL 5 MG/1
5 TABLET ORAL DAILY
Qty: 90 TABLET | Refills: 1 | Status: SHIPPED | OUTPATIENT
Start: 2023-11-13

## 2023-11-13 RX ORDER — CITALOPRAM 40 MG/1
40 TABLET ORAL DAILY
Qty: 90 TABLET | Refills: 1 | Status: SHIPPED | OUTPATIENT
Start: 2023-11-13

## 2023-11-13 NOTE — PROGRESS NOTES
Chief Complaint  Javier Almeida presents to [facility] for Annual Exam    Subjective          History of Present Illness    LEONEL is here today for annual preventive exam.  Has received covid vaccine X 2.  Declines covid booster, influenza, PNA vaccines.   UTD Tdap vaccine.   Former cigarette smoker. Quit in 2013.    Following up on HTN. Current medication includes Lisinopril 5 mg daily. Tolerating well.   Also following up on anxiety. Current medication includes citalopram 40 mg daily and buspirone PRN. He reports that he has been doing well on current medications.     Review of Systems   Constitutional:  Negative for chills and fever.   HENT:  Negative for ear pain and sore throat.    Eyes:  Negative for blurred vision and redness.   Respiratory:  Negative for shortness of breath and wheezing.    Cardiovascular:  Negative for chest pain and palpitations.   Gastrointestinal:  Negative for abdominal pain and vomiting.   Genitourinary:  Negative for frequency and urgency.   Skin:  Negative for rash.   Neurological:  Negative for seizures and syncope.   Psychiatric/Behavioral:  Negative for self-injury and suicidal ideas.          Allergies   Allergen Reactions    Penicillins Unknown - Low Severity      Past Medical History:   Diagnosis Date    Essential (primary) hypertension     Generalized anxiety disorder     Mild intermittent asthma, uncomplicated     Mixed irritable bowel syndrome     Nicotine dependence, unspecified, uncomplicated      Current Outpatient Medications   Medication Sig Dispense Refill    busPIRone (BUSPAR) 7.5 MG tablet Take 1 tablet by mouth Every 8 (Eight) Hours As Needed for anxiety. 90 tablet 1    citalopram (CeleXA) 40 MG tablet Take 1 tablet by mouth Daily. 90 tablet 1    dicyclomine (BENTYL) 20 MG tablet Take 1 tablet by mouth Every 8 (Eight) Hours As Needed.      esomeprazole (nexIUM) 20 MG capsule Take 1 capsule by mouth As Needed.      fluticasone  "(FLONASE) 50 MCG/ACT nasal spray Instill 2 sprays into the nostril(s) daily as directed by provider 16 g 11    lisinopril (PRINIVIL,ZESTRIL) 5 MG tablet Take 1 tablet by mouth Daily. 90 tablet 1    Loratadine (CLARITIN PO) Take  by mouth Daily.      naproxen sodium (ALEVE) 220 MG tablet Take 1 tablet by mouth 2 (Two) Times a Day As Needed.      traZODone (DESYREL) 50 MG tablet Take 1 tablet by mouth At Night As Needed.       No current facility-administered medications for this visit.     Past Surgical History:   Procedure Laterality Date    COLONOSCOPY  08/26/2010    WISDOM TOOTH EXTRACTION        Social History     Tobacco Use    Smoking status: Former     Packs/day: 0.50     Years: 20.00     Additional pack years: 0.00     Total pack years: 10.00     Types: Cigarettes     Quit date: 2013     Years since quitting: 10.8     Passive exposure: Past    Smokeless tobacco: Never   Vaping Use    Vaping Use: Every day    Substances: CBD, Flavoring    Devices: [employer] tank   Substance Use Topics    Alcohol use: Yes     Comment: SOCIALLY    Drug use: Defer     Family History   Problem Relation Age of Onset    Diabetes type II Mother         ADOPTED    No Known Problems Father     Bipolar disorder Sister      There are no preventive care reminders to display for this patient.     Immunization History   Administered Date(s) Administered    COVID-19 (PFIZER) Purple Cap Monovalent 04/02/2021, 04/23/2021    Tdap 10/31/2022        Objective     Vitals:    11/13/23 1105   BP: 122/82   BP Location: Left arm   Patient Position: Sitting   Cuff Size: Large Adult   Pulse: 77   Temp: 98.3 °F (36.8 °C)   TempSrc: Oral   SpO2: 95%   Weight: 106 kg (234 lb)   Height: 170.2 cm (67.01\")     Body mass index is 36.64 kg/m².     Class 2 Severe Obesity (BMI >=35 and <=39.9). Obesity-related health conditions include the following: hypertension. Obesity is unchanged. BMI is is above average; BMI management plan is completed. We discussed low " calorie, low carb based diet program.       Physical Exam  Vitals reviewed.   Constitutional:       General: He is not in acute distress.     Appearance: Normal appearance.   HENT:      Head: Normocephalic and atraumatic.      Right Ear: Hearing, tympanic membrane and ear canal normal.      Left Ear: Hearing, tympanic membrane and ear canal normal.      Mouth/Throat:      Mouth: Mucous membranes are moist.   Eyes:      Extraocular Movements: Extraocular movements intact.      Pupils: Pupils are equal, round, and reactive to light.   Cardiovascular:      Rate and Rhythm: Normal rate and regular rhythm.   Pulmonary:      Effort: Pulmonary effort is normal. No respiratory distress.      Breath sounds: Normal breath sounds.   Abdominal:      General: Bowel sounds are normal.      Palpations: Abdomen is soft.      Tenderness: There is no abdominal tenderness.   Musculoskeletal:         General: Normal range of motion.      Cervical back: Normal range of motion and neck supple.   Skin:     General: Skin is warm and dry.   Neurological:      Mental Status: He is alert and oriented to person, place, and time.   Psychiatric:         Mood and Affect: Mood normal.           Result Review :                               Assessment and Plan      Diagnoses and all orders for this visit:    1. Annual physical exam (Primary)  Comments:  Appropriate screenings and vaccinations were reviewed with the pt and offered as indicated.  Pt counseled on healthy lifestyle including healthy diet, exercise.  Orders:  -     CBC No Differential; Future  -     Comprehensive Metabolic Panel; Future  -     Lipid Panel; Future    2. Generalized anxiety disorder  Comments:  Medical condition is stable.  Continue same therapy.  Will recheck at next regular appointment  Orders:  -     citalopram (CeleXA) 40 MG tablet; Take 1 tablet by mouth Daily.  Dispense: 90 tablet; Refill: 1  -     busPIRone (BUSPAR) 7.5 MG tablet; Take 1 tablet by mouth Every 8  (Eight) Hours As Needed for anxiety.  Dispense: 90 tablet; Refill: 1    3. Essential (primary) hypertension  Comments:  Medical condition is stable.  Continue same therapy.  Will recheck at next regular appointment  Orders:  -     lisinopril (PRINIVIL,ZESTRIL) 5 MG tablet; Take 1 tablet by mouth Daily.  Dispense: 90 tablet; Refill: 1            Follow Up     Return for Recheck.

## 2024-02-15 ENCOUNTER — OFFICE VISIT (OUTPATIENT)
Dept: FAMILY MEDICINE CLINIC | Age: 44
End: 2024-02-15
Payer: COMMERCIAL

## 2024-02-15 VITALS
SYSTOLIC BLOOD PRESSURE: 129 MMHG | TEMPERATURE: 98.7 F | DIASTOLIC BLOOD PRESSURE: 81 MMHG | OXYGEN SATURATION: 98 % | HEART RATE: 85 BPM | WEIGHT: 232.2 LBS | BODY MASS INDEX: 36.44 KG/M2 | HEIGHT: 67 IN

## 2024-02-15 DIAGNOSIS — I10 ESSENTIAL (PRIMARY) HYPERTENSION: ICD-10-CM

## 2024-02-15 DIAGNOSIS — F41.1 GENERALIZED ANXIETY DISORDER: ICD-10-CM

## 2024-02-15 PROCEDURE — 99213 OFFICE O/P EST LOW 20 MIN: CPT | Performed by: NURSE PRACTITIONER

## 2024-02-15 RX ORDER — LISINOPRIL 5 MG/1
5 TABLET ORAL DAILY
Qty: 90 TABLET | Refills: 0 | Status: SHIPPED | OUTPATIENT
Start: 2024-02-15

## 2024-02-15 RX ORDER — CITALOPRAM 40 MG/1
40 TABLET ORAL DAILY
Qty: 90 TABLET | Refills: 0 | Status: SHIPPED | OUTPATIENT
Start: 2024-02-15

## 2024-02-15 RX ORDER — BUSPIRONE HYDROCHLORIDE 7.5 MG/1
7.5 TABLET ORAL EVERY 8 HOURS PRN
Qty: 90 TABLET | Refills: 0 | Status: SHIPPED | OUTPATIENT
Start: 2024-02-15

## 2024-05-09 ENCOUNTER — OFFICE VISIT (OUTPATIENT)
Dept: FAMILY MEDICINE CLINIC | Age: 44
End: 2024-05-09
Payer: COMMERCIAL

## 2024-05-09 ENCOUNTER — HOSPITAL ENCOUNTER (OUTPATIENT)
Dept: GENERAL RADIOLOGY | Facility: HOSPITAL | Age: 44
Discharge: HOME | End: 2024-05-09
Admitting: NURSE PRACTITIONER
Payer: COMMERCIAL

## 2024-05-09 VITALS
BODY MASS INDEX: 36.79 KG/M2 | WEIGHT: 234.4 LBS | TEMPERATURE: 97 F | OXYGEN SATURATION: 93 % | HEART RATE: 80 BPM | HEIGHT: 67 IN | DIASTOLIC BLOOD PRESSURE: 80 MMHG | SYSTOLIC BLOOD PRESSURE: 125 MMHG

## 2024-05-09 DIAGNOSIS — M25.511 ACUTE PAIN OF RIGHT SHOULDER: Primary | ICD-10-CM

## 2024-05-09 DIAGNOSIS — M25.511 ACUTE PAIN OF RIGHT SHOULDER: ICD-10-CM

## 2024-05-09 DIAGNOSIS — I10 ESSENTIAL (PRIMARY) HYPERTENSION: ICD-10-CM

## 2024-05-09 DIAGNOSIS — F41.1 GENERALIZED ANXIETY DISORDER: ICD-10-CM

## 2024-05-09 PROCEDURE — 99214 OFFICE O/P EST MOD 30 MIN: CPT | Performed by: NURSE PRACTITIONER

## 2024-05-09 PROCEDURE — 73030 X-RAY EXAM OF SHOULDER: CPT

## 2024-05-09 RX ORDER — METHYLPREDNISOLONE 4 MG/1
TABLET ORAL
Qty: 21 TABLET | Refills: 0 | Status: SHIPPED | OUTPATIENT
Start: 2024-05-09

## 2024-05-09 RX ORDER — CYCLOBENZAPRINE HCL 10 MG
10 TABLET ORAL 3 TIMES DAILY PRN
Qty: 30 TABLET | Refills: 0 | Status: SHIPPED | OUTPATIENT
Start: 2024-05-09 | End: 2024-05-19

## 2024-05-09 RX ORDER — LISINOPRIL 5 MG/1
5 TABLET ORAL DAILY
Qty: 90 TABLET | Refills: 0 | Status: CANCELLED | OUTPATIENT
Start: 2024-05-09

## 2024-05-09 RX ORDER — CITALOPRAM 40 MG/1
40 TABLET ORAL DAILY
Qty: 90 TABLET | Refills: 1 | Status: CANCELLED | OUTPATIENT
Start: 2024-05-09 | End: 2024-08-11

## 2024-05-28 ENCOUNTER — TELEPHONE (OUTPATIENT)
Dept: FAMILY MEDICINE CLINIC | Age: 44
End: 2024-05-28
Payer: COMMERCIAL

## 2024-05-28 NOTE — TELEPHONE ENCOUNTER
"    Caller: Javier Almeida \"J.O.\"    Relationship: Self    Best call back number: [phone] - PATIENT STATES A DETAILED VOICE MESSAGE CAN BE LEFT.     What form or medical record are you requesting: CD OF XRAY OF SHOULDER RESULTS. XRAY TAKEN ON 05.09.2024    Who is requesting this form or medical record from you: [facility]     How would you like to receive the form or medical records (pick-up, mail, fax):     Timeframe paperwork needed: AS SOON AS POSSIBLE    Additional notes: PATIENT STATES THAT HE HAS AN APPOINTMENT WITH [facility] ON 06.05.2024 AND THEY ARE REQUESTING PATIENT BRING RESULTS ON A CD OF XRAY OF HIS SHOULDER FROM 05.09.2024. PLEASE ADVISE.       "

## 2024-05-29 DIAGNOSIS — H69.91 DYSFUNCTION OF RIGHT EUSTACHIAN TUBE: ICD-10-CM

## 2024-05-29 RX ORDER — FLUTICASONE PROPIONATE 50 MCG
2 SPRAY, SUSPENSION (ML) NASAL DAILY
Qty: 16 G | Refills: 11 | Status: SHIPPED | OUTPATIENT
Start: 2024-05-29

## 2024-06-01 DIAGNOSIS — F41.1 GENERALIZED ANXIETY DISORDER: ICD-10-CM

## 2024-06-03 RX ORDER — CITALOPRAM 40 MG/1
40 TABLET ORAL DAILY
Qty: 90 TABLET | Refills: 0 | Status: SHIPPED | OUTPATIENT
Start: 2024-06-03

## 2024-06-10 DIAGNOSIS — M25.511 ACUTE PAIN OF RIGHT SHOULDER: ICD-10-CM

## 2024-06-11 RX ORDER — CYCLOBENZAPRINE HCL 10 MG
10 TABLET ORAL 3 TIMES DAILY PRN
Qty: 30 TABLET | Refills: 0 | Status: SHIPPED | OUTPATIENT
Start: 2024-06-11 | End: 2024-06-21

## 2024-06-24 DIAGNOSIS — F41.1 GENERALIZED ANXIETY DISORDER: ICD-10-CM

## 2024-06-24 RX ORDER — BUSPIRONE HYDROCHLORIDE 7.5 MG/1
7.5 TABLET ORAL EVERY 8 HOURS PRN
Qty: 90 TABLET | Refills: 0 | Status: CANCELLED | OUTPATIENT
Start: 2024-06-24

## 2024-07-07 DIAGNOSIS — M25.511 ACUTE PAIN OF RIGHT SHOULDER: ICD-10-CM

## 2024-07-09 RX ORDER — CYCLOBENZAPRINE HCL 10 MG
10 TABLET ORAL 3 TIMES DAILY PRN
Qty: 30 TABLET | Refills: 0 | Status: SHIPPED | OUTPATIENT
Start: 2024-07-09 | End: 2024-07-20

## 2024-08-15 ENCOUNTER — OFFICE VISIT (OUTPATIENT)
Dept: FAMILY MEDICINE CLINIC | Age: 44
End: 2024-08-15
Payer: COMMERCIAL

## 2024-08-15 ENCOUNTER — LAB (OUTPATIENT)
Dept: LAB | Facility: HOSPITAL | Age: 44
End: 2024-08-15
Payer: COMMERCIAL

## 2024-08-15 VITALS
DIASTOLIC BLOOD PRESSURE: 77 MMHG | HEART RATE: 80 BPM | TEMPERATURE: 98.4 F | SYSTOLIC BLOOD PRESSURE: 117 MMHG | OXYGEN SATURATION: 96 % | WEIGHT: 224.6 LBS | HEIGHT: 67 IN | BODY MASS INDEX: 35.25 KG/M2

## 2024-08-15 DIAGNOSIS — I10 ESSENTIAL (PRIMARY) HYPERTENSION: ICD-10-CM

## 2024-08-15 DIAGNOSIS — F41.1 GENERALIZED ANXIETY DISORDER: ICD-10-CM

## 2024-08-15 PROBLEM — M75.111 NONTRAUMATIC INCOMPLETE TEAR OF RIGHT ROTATOR CUFF: Status: ACTIVE | Noted: 2024-07-03

## 2024-08-15 LAB
ALBUMIN SERPL-MCNC: 4.4 G/DL (ref 3.5–5.2)
ALBUMIN/GLOB SERPL: 1.5 G/DL
ALP SERPL-CCNC: 76 U/L (ref 39–117)
ALT SERPL W P-5'-P-CCNC: 26 U/L (ref 1–41)
ANION GAP SERPL CALCULATED.3IONS-SCNC: 13.7 MMOL/L (ref 5–15)
AST SERPL-CCNC: 17 U/L (ref 1–40)
BILIRUB SERPL-MCNC: 0.4 MG/DL (ref 0–1.2)
BUN SERPL-MCNC: 13 MG/DL (ref 6–20)
BUN/CREAT SERPL: 12.4 (ref 7–25)
CALCIUM SPEC-SCNC: 9.7 MG/DL (ref 8.6–10.5)
CHLORIDE SERPL-SCNC: 102 MMOL/L (ref 98–107)
CHOLEST SERPL-MCNC: 164 MG/DL (ref 0–200)
CO2 SERPL-SCNC: 23.3 MMOL/L (ref 22–29)
CREAT SERPL-MCNC: 1.05 MG/DL (ref 0.76–1.27)
DEPRECATED RDW RBC AUTO: 42.1 FL (ref 37–54)
EGFRCR SERPLBLD CKD-EPI 2021: 90.9 ML/MIN/1.73
ERYTHROCYTE [DISTWIDTH] IN BLOOD BY AUTOMATED COUNT: 12.5 % (ref 12.3–15.4)
GLOBULIN UR ELPH-MCNC: 2.9 GM/DL
GLUCOSE SERPL-MCNC: 82 MG/DL (ref 65–99)
HCT VFR BLD AUTO: 44.9 % (ref 37.5–51)
HDLC SERPL-MCNC: 40 MG/DL (ref 40–60)
HGB BLD-MCNC: 15.2 G/DL (ref 13–17.7)
LDLC SERPL CALC-MCNC: 111 MG/DL (ref 0–100)
LDLC/HDLC SERPL: 2.78 {RATIO}
MCH RBC QN AUTO: 30.8 PG (ref 26.6–33)
MCHC RBC AUTO-ENTMCNC: 33.9 G/DL (ref 31.5–35.7)
MCV RBC AUTO: 91.1 FL (ref 79–97)
PLATELET # BLD AUTO: 319 10*3/MM3 (ref 140–450)
PMV BLD AUTO: 9.3 FL (ref 6–12)
POTASSIUM SERPL-SCNC: 4.4 MMOL/L (ref 3.5–5.2)
PROT SERPL-MCNC: 7.3 G/DL (ref 6–8.5)
RBC # BLD AUTO: 4.93 10*6/MM3 (ref 4.14–5.8)
SODIUM SERPL-SCNC: 139 MMOL/L (ref 136–145)
TRIGL SERPL-MCNC: 64 MG/DL (ref 0–150)
VLDLC SERPL-MCNC: 13 MG/DL (ref 5–40)
WBC NRBC COR # BLD AUTO: 7.19 10*3/MM3 (ref 3.4–10.8)

## 2024-08-15 PROCEDURE — 80061 LIPID PANEL: CPT

## 2024-08-15 PROCEDURE — 85027 COMPLETE CBC AUTOMATED: CPT

## 2024-08-15 PROCEDURE — 99214 OFFICE O/P EST MOD 30 MIN: CPT | Performed by: NURSE PRACTITIONER

## 2024-08-15 PROCEDURE — 36415 COLL VENOUS BLD VENIPUNCTURE: CPT

## 2024-08-15 PROCEDURE — 80053 COMPREHEN METABOLIC PANEL: CPT

## 2024-08-15 RX ORDER — LISINOPRIL 5 MG/1
5 TABLET ORAL DAILY
Qty: 90 TABLET | Refills: 1 | Status: SHIPPED | OUTPATIENT
Start: 2024-08-15

## 2024-08-15 RX ORDER — CITALOPRAM 40 MG/1
40 TABLET ORAL DAILY
Qty: 90 TABLET | Refills: 1 | Status: SHIPPED | OUTPATIENT
Start: 2024-08-15

## 2024-08-15 RX ORDER — CYCLOBENZAPRINE HCL 10 MG
10 TABLET ORAL 3 TIMES DAILY
COMMUNITY
Start: 2024-05-09

## 2024-08-15 RX ORDER — BUSPIRONE HYDROCHLORIDE 7.5 MG/1
7.5 TABLET ORAL EVERY 8 HOURS PRN
Qty: 90 TABLET | Refills: 1 | Status: SHIPPED | OUTPATIENT
Start: 2024-08-15

## 2024-08-15 NOTE — PROGRESS NOTES
Chief Complaint  Javier Almeida presents to [facility] for Hypertension    Subjective          History of Present Illness    LEONEL is here today to follow up on HTN. Current medication is Lisinopril 5 mg daily. Tolerating well.   Also following up on anxiety. Current medication includes citalopram 40 mg daily and buspirone PRN. Reports that he has been doing well on current medications and wishes to continue.     Review of Systems   Constitutional:  Negative for chills and fever.   HENT:  Negative for ear pain and sore throat.    Eyes:  Negative for blurred vision and redness.   Respiratory:  Negative for shortness of breath and wheezing.    Cardiovascular:  Negative for chest pain and palpitations.   Gastrointestinal:  Negative for abdominal pain and vomiting.   Genitourinary:  Negative for frequency and urgency.   Musculoskeletal:         Right shoulder pain improved after cortisone injection and PT   Skin:  Negative for rash.   Neurological:  Negative for seizures and syncope.   Psychiatric/Behavioral:  Negative for suicidal ideas and depressed mood.          Allergies   Allergen Reactions    Penicillins Unknown - Low Severity      Past Medical History:   Diagnosis Date    Essential (primary) hypertension     Generalized anxiety disorder     Mild intermittent asthma, uncomplicated     Mixed irritable bowel syndrome     Nicotine dependence, unspecified, uncomplicated      Current Outpatient Medications   Medication Sig Dispense Refill    busPIRone (BUSPAR) 7.5 MG tablet Take 1 tablet by mouth Every 8 (Eight) Hours As Needed for anxiety. 90 tablet 1    citalopram (CeleXA) 40 MG tablet Take 1 tablet by mouth Daily. 90 tablet 1    cyclobenzaprine (FLEXERIL) 10 MG tablet Take 1 tablet by mouth 3 (Three) Times a Day.      dicyclomine (BENTYL) 20 MG tablet Take 1 tablet by mouth Every 8 (Eight) Hours As Needed.      esomeprazole (nexIUM) 20 MG capsule Take 1 capsule by mouth As Needed.    "   fluticasone (FLONASE) 50 MCG/ACT nasal spray Instill 2 sprays into the nostril(s) daily as directed by provider 16 g 11    lisinopril (PRINIVIL,ZESTRIL) 5 MG tablet Take 1 tablet by mouth Daily. 90 tablet 1    Loratadine (CLARITIN PO) Take  by mouth Daily.      naproxen sodium (ALEVE) 220 MG tablet Take 1 tablet by mouth 2 (Two) Times a Day As Needed.      traZODone (DESYREL) 50 MG tablet Take 1 tablet by mouth At Night As Needed.       No current facility-administered medications for this visit.     Past Surgical History:   Procedure Laterality Date    COLONOSCOPY  2010    WISDOM TOOTH EXTRACTION        Social History     Tobacco Use    Smoking status: Former     Current packs/day: 0.00     Average packs/day: 0.5 packs/day for 20.0 years (10.0 ttl pk-yrs)     Types: Cigarettes     Start date:      Quit date:      Years since quittin.6     Passive exposure: Past    Smokeless tobacco: Never   Vaping Use    Vaping status: Every Day    Substances: CBD, Flavoring    Devices: [employer] tank   Substance Use Topics    Alcohol use: Yes     Comment: SOCIALLY    Drug use: Defer     Family History   Problem Relation Age of Onset    Diabetes type II Mother         ADOPTED    No Known Problems Father     Bipolar disorder Sister      Health Maintenance Due   Topic Date Due    INFLUENZA VACCINE  2024      Immunization History   Administered Date(s) Administered    COVID-19 (PFIZER) Purple Cap Monovalent 2021, 2021    Tdap 10/31/2022        Objective     Vitals:    08/15/24 1139   BP: 117/77   BP Location: Left arm   Patient Position: Sitting   Cuff Size: Large Adult   Pulse: 80   Temp: 98.4 °F (36.9 °C)   TempSrc: Oral   SpO2: 96%   Weight: 102 kg (224 lb 9.6 oz)   Height: 170.2 cm (67.01\")     Body mass index is 35.17 kg/m².                No results found.    Physical Exam  Vitals reviewed.   Constitutional:       General: He is not in acute distress.     Appearance: Normal appearance. He " is well-developed.   HENT:      Head: Normocephalic and atraumatic.   Cardiovascular:      Rate and Rhythm: Normal rate and regular rhythm.   Pulmonary:      Effort: Pulmonary effort is normal.      Breath sounds: Normal breath sounds.   Neurological:      Mental Status: He is alert and oriented to person, place, and time.   Psychiatric:         Mood and Affect: Mood and affect normal.           Result Review :                               Assessment and Plan      Assessment & Plan  Essential (primary) hypertension  Hypertension is stable and controlled  Continue current treatment regimen.  Blood pressure will be reassessed in 6 months.  Generalized anxiety disorder    Medical condition is stable.  Continue same therapy.  Will recheck at next regular appointment      Orders Placed This Encounter   Procedures    CBC No Differential    Comprehensive metabolic panel    Lipid panel     New Medications Ordered This Visit   Medications    lisinopril (PRINIVIL,ZESTRIL) 5 MG tablet     Sig: Take 1 tablet by mouth Daily.     Dispense:  90 tablet     Refill:  1    citalopram (CeleXA) 40 MG tablet     Sig: Take 1 tablet by mouth Daily.     Dispense:  90 tablet     Refill:  1    busPIRone (BUSPAR) 7.5 MG tablet     Sig: Take 1 tablet by mouth Every 8 (Eight) Hours As Needed for anxiety.     Dispense:  90 tablet     Refill:  1             Follow Up     Return in about 6 months (around 2/15/2025) for Annual physical.

## 2025-01-06 NOTE — PROGRESS NOTES
Javier Almeida TRAVIS  1980     Office/Outpatient Visit    Visit Date: Fri, Apr 9, 2021 11:42 am    Provider: Stephanie Porras N.P. (Assistant: Gabriella Bennett MA)    Location: [facility]        Electronically signed by Stephanie Porras N.P. on  04/13/2021 01:55:21 PM                             Subjective:        CC: LEONEL is a 40 year old White male.  Patient presents today for follow up visit         HPI: HILLARY ROBLES is following up on anxiety and IBS. IBS was diagnosed about 2 years ago when living in [facility]. He had colonoscopy, EGD, and advised to eat high fiber diet. He takes Dicyclomine on occasion and PPI daily. Was started on Citalopram and Trazodone PRN. He had noted some improvement. Has been working on eating better diet. Has had some increased stomach upset that he feels may be due to his anxiety. Feels that he may need to adjust medications.    ROS:     CONSTITUTIONAL:  Negative for chills and fever.      CARDIOVASCULAR:  Negative for chest pain and palpitations.      RESPIRATORY:  Negative for dyspnea and frequent wheezing.      MUSCULOSKELETAL:  Negative for joint stiffness and myalgias.      INTEGUMENTARY:  Negative for rash.      NEUROLOGICAL:  Negative for dizziness and headaches.      HEMATOLOGIC/LYMPHATIC:  Negative for easy bruising and lymphadenopathy.      PSYCHIATRIC:  Positive for anxiety.   Negative for suicidal thoughts.          Past Medical History / Family History / Social History:         Last Reviewed on 4/09/2021 11:58 AM by Stephanie Porras    Past Medical History:             PAST MEDICAL HISTORY         Hypertension: no longer on medication;     Asthma: dx'd at age childhood;     Irritable Bowel Syndrome: diagnosed 2018 when living in [facility];     Allergies         PREVENTIVE HEALTH MAINTENANCE             COLORECTAL CANCER SCREENING: Up to date (colonoscopy q10y; sigmoidoscopy q5y; Cologuard q3y) was last done 8/26/2010, Results are in chart; EGD done in  [facility]         Surgical History:         wisdom teeth removal;         Family History:     Father: Healthy     Mother: Type 2 Diabetes;  adopted     Sister(s): Bipolar Disorder         Social History:     Occupation: [employer]     Marital Status:      Children: 2 children         Tobacco/Alcohol/Supplements:     Last Reviewed on 4/09/2021 11:46 AM by Gabriella Bennett    Tobacco: Current Smoker: He currently smokes some days, Cigars.          Alcohol: Frequency: Socially         Current Problems:     Nicotine dependence, unspecified, uncomplicated    Generalized anxiety disorder    Essential (primary) hypertension    Mild intermittent asthma, uncomplicated    Mixed irritable bowel syndrome    Encounter for general adult medical examination without abnormal findings    Encounter for screening for depression        Immunizations:     SARS-COV-2 (COVID-19) vaccine, UNSPECIFIED 4/2/2021        Allergies:     Last Reviewed on 4/09/2021 11:46 AM by Gabriella Bennett    Penicillins:          Current Medications:     Last Reviewed on 4/09/2021 11:46 AM by Gabriella Bennett    ZyrTEC 10 mg oral tablet [take 1 tablet (10 mg) by oral route once daily]    esomeprazole magnesium 20 mg oral capsule,delayed release (enteric coated) [take 1 capsule]    melatonin 10 mg oral tablet [one tablet po qhs]    naproxen     dicyclomine 20 mg oral tablet [take 1 tablet (20 mg) by oral route 3 times per day as needed]    citalopram 20 mg oral tablet [take 1 tablet (20 mg) by oral route once daily]    traZODone 50 mg oral tablet [TAKE 1/2 TO 1 TABLET BY MOUTH AT BEDTIME AS NEEDED]        Objective:        Vitals:         Historical:     10/23/2020  BP:   136/81 mm Hg ( (left arm, , sitting, );) 10/23/2020  P:   71bpm ( (left arm (BP Cuff), , sitting, );) 10/23/2020  Wt:   212.6lbs    Current: 4/9/2021 11:47:36 AM    Ht:  5 ft, 7 in;  Wt: 213.2 lbs;  BMI: 33.4T: 99.1 F (temporal);  BP: 137/85 mm Hg (left arm, sitting);  P: 70 bpm (left  arm (BP Cuff), sitting);  sCr: 1.08 mg/dL;  GFR: 92.97        Exams:     PHYSICAL EXAM:     GENERAL: well developed, well nourished;  no apparent distress;     RESPIRATORY: normal respiratory rate and pattern with no distress; normal breath sounds with no rales, rhonchi, wheezes or rubs;     CARDIOVASCULAR: normal rate; rhythm is regular;     GASTROINTESTINAL: nontender; normal bowel sounds; no organomegaly;     MUSCULOSKELETAL: normal gait;     NEUROLOGIC: mental status: alert and oriented x 3; GROSSLY INTACT     PSYCHIATRIC: appropriate affect and demeanor; normal speech pattern; normal thought and perception;         Assessment:         F41.1   Generalized anxiety disorder       J45.20   Mild intermittent asthma, uncomplicated       K58.2   Mixed irritable bowel syndrome       F17.200   Nicotine dependence, unspecified, uncomplicated       Z13.31   Encounter for screening for depression           ORDERS:         Meds Prescribed:       [New Rx] citalopram 40 mg oral tablet [take 1 tablet (40 mg) by oral route once daily], #90 (ninety) tablets, Refills: 0 (zero)         Lab Orders:       APPTO  Appointment need  (In-House)              Other Orders:       4004F  Pt scrnd tobacco use rcvd tobacco cessation talk  (In-House)              Depression screen positive and follow up plan documented  (In-House)                      Plan:         Generalized anxiety disorderWill increase dose of Citalopram to 40 mg daily. Continue Trazodone as needed. F/U 3 months or sooner as needed.    MIPS Vaccines Flu and Pneumonia updated in Shot record     FOLLOW-UP: Schedule a follow-up visit in 3 months.            Prescriptions:       [New Rx] citalopram 40 mg oral tablet [take 1 tablet (40 mg) by oral route once daily], #90 (ninety) tablets, Refills: 0 (zero)           Orders:       APPTO  Appointment need  (In-House)              Mild intermittent asthma, uncomplicatedStable        Mixed irritable bowel syndromeContinue with  low FODMAP diet.         Nicotine dependence, unspecified, uncomplicated        RECOMMENDATIONS given include: Counseled on smoking cessation and advised of the benefits to patient's health if he were to stop smoking. Counseling for less than 3 minutes.            Orders:       4004F  Pt scrnd tobacco use rcvd tobacco cessation talk  (In-House)              Encounter for screening for depression    MIPS PHQ-9 Depression Screening: Completed form scanned and in chart; Total Score 7 Positive Depression Screen: Suicide Risk Assessment completed--denies suicidal/homicidal ideation; Pharmacologic intervention initiated/modified           Orders:         Depression screen positive and follow up plan documented  (In-House)                  Patient Recommendations:        For  Generalized anxiety disorder:    Schedule a follow-up visit in 3 months.          For  Nicotine dependence, unspecified, uncomplicated:        Stop smoking.              Charge Capture:         Primary Diagnosis:     F41.1  Generalized anxiety disorder           Orders:      07669  Office/outpatient visit; established patient, level 4  (In-House)            APPTO  Appointment need  (In-House)              J45.20  Mild intermittent asthma, uncomplicated     K58.2  Mixed irritable bowel syndrome     F17.200  Nicotine dependence, unspecified, uncomplicated           Orders:      4004F  Pt scrnd tobacco use rcvd tobacco cessation talk  (In-House)              Z13.31  Encounter for screening for depression           Orders:        Depression screen positive and follow up plan documented  (In-House)                      170.18

## 2025-02-15 DIAGNOSIS — I10 ESSENTIAL (PRIMARY) HYPERTENSION: ICD-10-CM

## 2025-02-15 DIAGNOSIS — F41.1 GENERALIZED ANXIETY DISORDER: ICD-10-CM

## 2025-02-17 RX ORDER — CITALOPRAM HYDROBROMIDE 40 MG/1
40 TABLET ORAL DAILY
Qty: 90 TABLET | Refills: 1 | Status: CANCELLED | OUTPATIENT
Start: 2025-02-17

## 2025-02-17 RX ORDER — LISINOPRIL 5 MG/1
5 TABLET ORAL DAILY
Qty: 90 TABLET | Refills: 1 | Status: SHIPPED | OUTPATIENT
Start: 2025-02-17 | End: 2025-02-20 | Stop reason: SDUPTHER

## 2025-02-20 ENCOUNTER — OFFICE VISIT (OUTPATIENT)
Dept: FAMILY MEDICINE CLINIC | Age: 45
End: 2025-02-20
Payer: COMMERCIAL

## 2025-02-20 ENCOUNTER — LAB (OUTPATIENT)
Dept: LAB | Facility: HOSPITAL | Age: 45
End: 2025-02-20
Payer: COMMERCIAL

## 2025-02-20 VITALS
SYSTOLIC BLOOD PRESSURE: 122 MMHG | HEIGHT: 67 IN | DIASTOLIC BLOOD PRESSURE: 80 MMHG | TEMPERATURE: 98.1 F | BODY MASS INDEX: 37.48 KG/M2 | HEART RATE: 75 BPM | OXYGEN SATURATION: 99 % | WEIGHT: 238.8 LBS

## 2025-02-20 DIAGNOSIS — Z00.00 ANNUAL PHYSICAL EXAM: Primary | ICD-10-CM

## 2025-02-20 DIAGNOSIS — Z00.00 ANNUAL PHYSICAL EXAM: ICD-10-CM

## 2025-02-20 DIAGNOSIS — I10 ESSENTIAL (PRIMARY) HYPERTENSION: ICD-10-CM

## 2025-02-20 DIAGNOSIS — J30.9 ALLERGIC RHINITIS, UNSPECIFIED SEASONALITY, UNSPECIFIED TRIGGER: ICD-10-CM

## 2025-02-20 DIAGNOSIS — F41.1 GENERALIZED ANXIETY DISORDER: ICD-10-CM

## 2025-02-20 LAB
ALBUMIN SERPL-MCNC: 4.1 G/DL (ref 3.5–5.2)
ALBUMIN/GLOB SERPL: 1.4 G/DL
ALP SERPL-CCNC: 74 U/L (ref 39–117)
ALT SERPL W P-5'-P-CCNC: 34 U/L (ref 1–41)
ANION GAP SERPL CALCULATED.3IONS-SCNC: 6.7 MMOL/L (ref 5–15)
AST SERPL-CCNC: 24 U/L (ref 1–40)
BILIRUB SERPL-MCNC: 0.7 MG/DL (ref 0–1.2)
BUN SERPL-MCNC: 11 MG/DL (ref 6–20)
BUN/CREAT SERPL: 8.3 (ref 7–25)
CALCIUM SPEC-SCNC: 9.6 MG/DL (ref 8.6–10.5)
CHLORIDE SERPL-SCNC: 105 MMOL/L (ref 98–107)
CHOLEST SERPL-MCNC: 170 MG/DL (ref 0–200)
CO2 SERPL-SCNC: 26.3 MMOL/L (ref 22–29)
CREAT SERPL-MCNC: 1.32 MG/DL (ref 0.76–1.27)
DEPRECATED RDW RBC AUTO: 41 FL (ref 37–54)
EGFRCR SERPLBLD CKD-EPI 2021: 69.1 ML/MIN/1.73
ERYTHROCYTE [DISTWIDTH] IN BLOOD BY AUTOMATED COUNT: 12.4 % (ref 12.3–15.4)
GLOBULIN UR ELPH-MCNC: 2.9 GM/DL
GLUCOSE SERPL-MCNC: 84 MG/DL (ref 65–99)
HCT VFR BLD AUTO: 44.2 % (ref 37.5–51)
HDLC SERPL-MCNC: 36 MG/DL (ref 40–60)
HGB BLD-MCNC: 15.1 G/DL (ref 13–17.7)
LDLC SERPL CALC-MCNC: 120 MG/DL (ref 0–100)
LDLC/HDLC SERPL: 3.31 {RATIO}
MCH RBC QN AUTO: 30.4 PG (ref 26.6–33)
MCHC RBC AUTO-ENTMCNC: 34.2 G/DL (ref 31.5–35.7)
MCV RBC AUTO: 88.9 FL (ref 79–97)
PLATELET # BLD AUTO: 299 10*3/MM3 (ref 140–450)
PMV BLD AUTO: 9.3 FL (ref 6–12)
POTASSIUM SERPL-SCNC: 4.5 MMOL/L (ref 3.5–5.2)
PROT SERPL-MCNC: 7 G/DL (ref 6–8.5)
RBC # BLD AUTO: 4.97 10*6/MM3 (ref 4.14–5.8)
SODIUM SERPL-SCNC: 138 MMOL/L (ref 136–145)
TRIGL SERPL-MCNC: 74 MG/DL (ref 0–150)
VLDLC SERPL-MCNC: 14 MG/DL (ref 5–40)
WBC NRBC COR # BLD AUTO: 6.82 10*3/MM3 (ref 3.4–10.8)

## 2025-02-20 PROCEDURE — 85027 COMPLETE CBC AUTOMATED: CPT

## 2025-02-20 PROCEDURE — 36415 COLL VENOUS BLD VENIPUNCTURE: CPT

## 2025-02-20 PROCEDURE — 80053 COMPREHEN METABOLIC PANEL: CPT

## 2025-02-20 PROCEDURE — 80061 LIPID PANEL: CPT

## 2025-02-20 PROCEDURE — 99214 OFFICE O/P EST MOD 30 MIN: CPT | Performed by: NURSE PRACTITIONER

## 2025-02-20 PROCEDURE — 99396 PREV VISIT EST AGE 40-64: CPT | Performed by: NURSE PRACTITIONER

## 2025-02-20 RX ORDER — CITALOPRAM HYDROBROMIDE 40 MG/1
40 TABLET ORAL DAILY
Qty: 90 TABLET | Refills: 1 | Status: SHIPPED | OUTPATIENT
Start: 2025-02-20

## 2025-02-20 RX ORDER — LISINOPRIL 5 MG/1
5 TABLET ORAL DAILY
Qty: 90 TABLET | Refills: 1 | Status: SHIPPED | OUTPATIENT
Start: 2025-02-20

## 2025-02-20 RX ORDER — FLUTICASONE PROPIONATE 50 MCG
2 SPRAY, SUSPENSION (ML) NASAL DAILY
Qty: 16 G | Refills: 11 | Status: SHIPPED | OUTPATIENT
Start: 2025-02-20

## 2025-02-20 NOTE — ASSESSMENT & PLAN NOTE
Hypertension is stable and controlled  Continue current treatment regimen.  Blood pressure will be reassessed in 6 months.    Orders:    lisinopril (PRINIVIL,ZESTRIL) 5 MG tablet; Take 1 tablet by mouth Daily.

## 2025-02-20 NOTE — ASSESSMENT & PLAN NOTE
Medical condition is stable.  Continue same therapy.  Will recheck at next regular appointment    Orders:    citalopram (CeleXA) 40 MG tablet; Take 1 tablet by mouth Daily.

## 2025-02-20 NOTE — PROGRESS NOTES
Chief Complaint  Javier Almeida presents to [facility] for Annual Exam    Subjective          History of Present Illness    LEONEL is here today for annual preventive exam.  Has received covid vaccine X 2.  Declines influenza, PNA vaccines.   UTD Tdap vaccine.   Former smoker. Quit in 2013.    Also following up on HTN. Current medication is Lisinopril 5 mg daily. Tolerating well.   Also following up on anxiety. Current medication includes citalopram 40 mg daily and buspirone PRN. Reports that he has been doing well on current medications. Wishes to continue current medication.     Review of Systems   Constitutional:  Negative for chills and fever.   HENT:  Negative for ear pain and sore throat.    Eyes:  Negative for blurred vision and redness.   Respiratory:  Negative for shortness of breath and wheezing.    Cardiovascular:  Negative for chest pain and palpitations.   Gastrointestinal:  Negative for abdominal pain and vomiting.   Genitourinary:  Negative for frequency and urgency.   Skin:  Negative for rash.   Neurological:  Negative for seizures and syncope.   Psychiatric/Behavioral:  Negative for suicidal ideas and depressed mood.          Allergies   Allergen Reactions    Penicillins Unknown - Low Severity      Past Medical History:   Diagnosis Date    Essential (primary) hypertension     Generalized anxiety disorder     Mild intermittent asthma, uncomplicated     Mixed irritable bowel syndrome     Nicotine dependence, unspecified, uncomplicated      Current Outpatient Medications   Medication Sig Dispense Refill    busPIRone (BUSPAR) 7.5 MG tablet Take 1 tablet by mouth Every 8 (Eight) Hours As Needed for anxiety. 90 tablet 1    citalopram (CeleXA) 40 MG tablet Take 1 tablet by mouth Daily. 90 tablet 1    cyclobenzaprine (FLEXERIL) 10 MG tablet Take 1 tablet by mouth 3 (Three) Times a Day.      dicyclomine (BENTYL) 20 MG tablet Take 1 tablet by mouth Every 8 (Eight) Hours As  "Needed.      esomeprazole (nexIUM) 20 MG capsule Take 1 capsule by mouth As Needed.      fluticasone (FLONASE) 50 MCG/ACT nasal spray Instill 2 sprays into the nostril(s) daily as directed by provider 16 g 11    lisinopril (PRINIVIL,ZESTRIL) 5 MG tablet Take 1 tablet by mouth Daily. 90 tablet 1    Loratadine (CLARITIN PO) Take  by mouth Daily.      naproxen sodium (ALEVE) 220 MG tablet Take 1 tablet by mouth 2 (Two) Times a Day As Needed.      traZODone (DESYREL) 50 MG tablet Take 1 tablet by mouth At Night As Needed.       No current facility-administered medications for this visit.     Past Surgical History:   Procedure Laterality Date    COLONOSCOPY  08/26/2010    WISDOM TOOTH EXTRACTION        Social History     Tobacco Use    Smoking status: Former     Types: Cigars     Passive exposure: Past    Smokeless tobacco: Never   Vaping Use    Vaping status: Former    Substances: CBD, Flavoring    Devices: Refillable tank   Substance Use Topics    Alcohol use: Yes     Comment: SOCIALLY    Drug use: Defer     Family History   Problem Relation Age of Onset    Diabetes type II Mother         ADOPTED    No Known Problems Father     Bipolar disorder Sister      Health Maintenance Due   Topic Date Due    ANNUAL PHYSICAL  11/13/2024      Immunization History   Administered Date(s) Administered    COVID-19 (PFIZER) Purple Cap Monovalent 04/02/2021, 04/23/2021    Tdap 10/31/2022        Objective     Vitals:    02/20/25 1114   BP: 122/80   Pulse: 75   Temp: 98.1 °F (36.7 °C)   TempSrc: Oral   SpO2: 99%   Weight: 108 kg (238 lb 12.8 oz)   Height: 170.2 cm (67.01\")     Body mass index is 37.39 kg/m².   Class 2 Severe Obesity (BMI >=35 and <=39.9). Obesity-related health conditions include the following: hypertension. Obesity is worsening. BMI is is above average; BMI management plan is completed. We discussed portion control and increasing exercise.            No results found.    Physical Exam  Vitals reviewed.   Constitutional:  "      General: He is not in acute distress.     Appearance: Normal appearance.   HENT:      Head: Normocephalic and atraumatic.      Right Ear: Hearing, tympanic membrane and ear canal normal.      Left Ear: Hearing, tympanic membrane and ear canal normal.      Mouth/Throat:      Mouth: Mucous membranes are moist.   Eyes:      Extraocular Movements: Extraocular movements intact.      Pupils: Pupils are equal, round, and reactive to light.   Cardiovascular:      Rate and Rhythm: Normal rate and regular rhythm.   Pulmonary:      Effort: Pulmonary effort is normal. No respiratory distress.      Breath sounds: Normal breath sounds.   Abdominal:      General: Bowel sounds are normal.      Palpations: Abdomen is soft.      Tenderness: There is no abdominal tenderness.   Musculoskeletal:         General: Normal range of motion.      Cervical back: Normal range of motion and neck supple.   Skin:     General: Skin is warm and dry.   Neurological:      Mental Status: He is alert and oriented to person, place, and time.   Psychiatric:         Mood and Affect: Mood normal.           Result Review :                               Assessment and Plan      Assessment & Plan  Annual physical exam  Appropriate screenings and vaccinations were reviewed with the pt and offered as indicated.  Pt counseled on healthy lifestyle including healthy diet, exercise.    Orders:    CBC (No Diff); Future    Lipid Panel; Future    Comprehensive Metabolic Panel; Future    Essential (primary) hypertension  Hypertension is stable and controlled  Continue current treatment regimen.  Blood pressure will be reassessed in 6 months.    Orders:    lisinopril (PRINIVIL,ZESTRIL) 5 MG tablet; Take 1 tablet by mouth Daily.    Generalized anxiety disorder    Medical condition is stable.  Continue same therapy.  Will recheck at next regular appointment    Orders:    citalopram (CeleXA) 40 MG tablet; Take 1 tablet by mouth Daily.    Allergic rhinitis, unspecified  seasonality, unspecified trigger  Medical condition is stable.  Continue same therapy.  Will recheck at next regular appointment    Orders:    fluticasone (FLONASE) 50 MCG/ACT nasal spray; Instill 2 sprays into the nostril(s) daily as directed by provider              Follow Up     Return in about 6 months (around 8/20/2025) for Recheck.

## 2025-07-24 DIAGNOSIS — F41.1 GENERALIZED ANXIETY DISORDER: ICD-10-CM

## 2025-07-25 RX ORDER — BUSPIRONE HYDROCHLORIDE 7.5 MG/1
7.5 TABLET ORAL EVERY 8 HOURS PRN
Qty: 90 TABLET | Refills: 0 | Status: SHIPPED | OUTPATIENT
Start: 2025-07-25

## 2025-08-20 ENCOUNTER — OFFICE VISIT (OUTPATIENT)
Dept: FAMILY MEDICINE CLINIC | Age: 45
End: 2025-08-20
Payer: COMMERCIAL

## 2025-08-20 VITALS
DIASTOLIC BLOOD PRESSURE: 80 MMHG | BODY MASS INDEX: 37.32 KG/M2 | TEMPERATURE: 98.4 F | HEART RATE: 79 BPM | SYSTOLIC BLOOD PRESSURE: 122 MMHG | HEIGHT: 67 IN | WEIGHT: 237.8 LBS | OXYGEN SATURATION: 97 %

## 2025-08-20 DIAGNOSIS — F41.1 GENERALIZED ANXIETY DISORDER: ICD-10-CM

## 2025-08-20 DIAGNOSIS — I10 ESSENTIAL (PRIMARY) HYPERTENSION: ICD-10-CM

## 2025-08-20 PROCEDURE — 99214 OFFICE O/P EST MOD 30 MIN: CPT | Performed by: NURSE PRACTITIONER

## 2025-08-20 RX ORDER — LISINOPRIL 5 MG/1
5 TABLET ORAL DAILY
Qty: 90 TABLET | Refills: 1 | Status: SHIPPED | OUTPATIENT
Start: 2025-08-20

## 2025-08-20 RX ORDER — CITALOPRAM HYDROBROMIDE 40 MG/1
40 TABLET ORAL DAILY
Qty: 90 TABLET | Refills: 1 | Status: SHIPPED | OUTPATIENT
Start: 2025-08-20